# Patient Record
Sex: MALE | Race: BLACK OR AFRICAN AMERICAN | NOT HISPANIC OR LATINO | Employment: UNEMPLOYED | URBAN - METROPOLITAN AREA
[De-identification: names, ages, dates, MRNs, and addresses within clinical notes are randomized per-mention and may not be internally consistent; named-entity substitution may affect disease eponyms.]

---

## 2023-11-29 DIAGNOSIS — R13.10 DYSPHAGIA, UNSPECIFIED TYPE: ICD-10-CM

## 2023-11-29 RX ORDER — ESOMEPRAZOLE MAGNESIUM 20 MG/1
20 GRANULE, DELAYED RELEASE ORAL DAILY
COMMUNITY
End: 2023-11-29 | Stop reason: SDUPTHER

## 2023-11-29 RX ORDER — ESOMEPRAZOLE MAGNESIUM 20 MG/1
20 GRANULE, DELAYED RELEASE ORAL DAILY
Qty: 90 EACH | Refills: 1 | Status: SHIPPED | OUTPATIENT
Start: 2023-11-29 | End: 2024-02-26 | Stop reason: SDUPTHER

## 2023-12-06 ENCOUNTER — TELEPHONE (OUTPATIENT)
Dept: GASTROENTEROLOGY | Facility: CLINIC | Age: 28
End: 2023-12-06
Payer: MEDICAID

## 2023-12-06 NOTE — TELEPHONE ENCOUNTER
Fatou Nix, Norma's mom, called asking if it's possible to get an order for another feeding machine and pole for his day program? They are using the bolus method but Norma is getting upset as he's not used to that.   Please advise       Thank You

## 2024-01-22 ENCOUNTER — TELEMEDICINE (OUTPATIENT)
Dept: BEHAVIORAL HEALTH | Facility: CLINIC | Age: 29
End: 2024-01-22
Payer: MEDICAID

## 2024-01-22 DIAGNOSIS — F90.2 ATTENTION DEFICIT HYPERACTIVITY DISORDER (ADHD), COMBINED TYPE: ICD-10-CM

## 2024-01-22 DIAGNOSIS — F93.0 SEPARATION ANXIETY: Primary | ICD-10-CM

## 2024-01-22 DIAGNOSIS — F79 INTELLECTUAL DISABILITY: ICD-10-CM

## 2024-01-22 DIAGNOSIS — F84.0 AUTISM SPECTRUM DISORDER (HHS-HCC): ICD-10-CM

## 2024-01-22 PROCEDURE — 99214 OFFICE O/P EST MOD 30 MIN: CPT | Performed by: STUDENT IN AN ORGANIZED HEALTH CARE EDUCATION/TRAINING PROGRAM

## 2024-01-22 PROCEDURE — 90785 PSYTX COMPLEX INTERACTIVE: CPT | Performed by: STUDENT IN AN ORGANIZED HEALTH CARE EDUCATION/TRAINING PROGRAM

## 2024-01-22 PROCEDURE — 90833 PSYTX W PT W E/M 30 MIN: CPT | Performed by: STUDENT IN AN ORGANIZED HEALTH CARE EDUCATION/TRAINING PROGRAM

## 2024-01-22 NOTE — PROGRESS NOTES
PRESENT FOR VISIT:  -Patient  -Mother        LAST INTERVENTION  Last seen about 8 months ago.       #Interval Change  -Continues to have challenging behavior.  -Has tried severally day programs. The last one lasted about 2 weeks. Mother reports not being able to find one that is a good fit. Reports that the programs probably lacked structure and was not stimulating enough.       #ADHD  Stable.   Recall from prior: Hyperactivity not improved with higher dose. Instead seems more restless, anxious. Also exhibiting increased time spent ritualistically/repeatedly touching things around the house. Change noticeable within days of starting higher dose.        #Behavior   Stable. Continues to have challenging behavior.  No report of physical aggression, property destruction, elopement, or self-injurious behavior. Recall from prior: Occasionally grabs at mom's arm for attention. Prior to trying stimulant, had been noted to be hitting himself with his hand or hitting his head on the wall in anger.         #Anxiety/Separation Anxiety  Stable.   -No report of excessive worrying, perseverating, or reassurance-seeking behavior.   -No report of significant restlessness, pacing, or other signs suggesting psychomotor agitation.   -No report of signs/symptoms consistent with separation anxiety or panic attacks.  Recall from prior: Mom now working out of the basement. She reports that she has been able to get away for work and also goes out for errands. Patient no longer as preoccupied with her. Able to remain composed in her absence. Able to spend time with himself.         #Mood/Irritability   Stable.  -No report of significant changes in mood, crying spells, frequent mood swings, or significant irritability.   -No report of concern for depression from staff/family.  Recall from prior: rarely having meltdowns, maybe 1 to 3 per week. Able to calm him down a lot faster than before. Rarely lasting more 5 minutes instead of previous 15  minutes. No longer having raging anger and lashing out at others. Recall from prior, patient tends to give off a grunting noise ahead of getting angry but grunting does not always lead to anger.         #Sleep   Stable.  Recall from prior: New onset report of difficulty sleeping since starting increased stimulant dose. Problems with both falling asleep and awaking early. Has been taking stimulant around 8:00am. Recall from prior: once or twice per week will not sleep much (baseline).         #Medication  Mother reports that patient only takes Clonidine currently. Notes that she stopped administering Methylphenidate due to the side effects.   -Endorses medication adherence.  -No report of concerns for medication side effects.   -No report of significant issues with constipation (beyond baseline chronic constipation), excessive sedation, drooling, dizziness, tremors, rigidity, or shuffling gait.  -Patient/caregiver report that current medication regimen is felt to be effective and beneficial.  -Patient/caregiver report strong preference for not making medication changes at this time.       #Health  Mother reports that BP has been around 110/70. Patient exercises occasionally.  No report of hospitalizations, ED visits, new/worsening medical issues, or changes in non-psych medication since last visit.   Patient does not have a dentist.         REVIEW OF SYMPTOMS  -Depression: negative  -Anxiety: negative  -Psychosis: negative  -Nadia: negative  -ADHD: negative  -OCD: negative  -ASD: Deficits in social-emotional reciprocity. Some insistence on sameness. Will rearrange items in the bathroom for example. However, does not get excessively upset if prevented from doing so. Does well with transitions. Does not exhibit inflexible adherence to routines. Stereotyped/repetitive motor movements have resolved in adolescence. Loves doing puzzles, coloring, and playing [place], but mother reports not feeling that these are  obsessions.  -Aggression: does not historically behave aggressively towards others. However, on two occasions in the past two years he has grabbed his sister's arm in anger. See HPI for recent.   -Self-injury: when frustrated, occasionally hits his head on his tube feed machine or Nintendo switch/Gameboy  -Sleep: No issues reported. Getting adequate sleep and appearing well-rested. No changes from baseline. In the past did have a history of sleep issues but this resolved with CBD oil that he is no longer taking   -Appetite: No report of issues  -Medical ROS: patient does not endorse difficulty urinating, constipation, seizures, rash, or polydipsia.   *All other systems have been reviewed and are negative for complaint unless otherwise noted above           PAST PSYCHIATRIC HISTORY  Diagnosed with ASD and ID at around 20 months. Around age 15yo was briefly on Prozac for behavioral issues. At one point patient tried to open car door as mother was driving. Briefly hospitalized in [place]. Hospital assumed that to be a suicide attempt but mother disputes that. Patient was discharged on Zoloft. Soon after they discovered that patient had GI issues. Once those were addressed, behaviors resolved and he stopped the medication. Has not been on medications since then. Has not followed with psychiatry or been hospitalized since then either. In the past has had issues with irregular sleep schedule. Mother reports having used CBD oil for that but has since stopped as sleep has since normalized.     SOCIAL HISTORY:   -Lives with mom and two adult siblings  -Family: significant involvement  -Care needs: 24/7 supervision, requires supervision with ADLs, assistance with iADLS   -Work/School: attendance suspended during COVID pandemic since March  -Guardianship: mother is guardian  -Cognitive abilities: Formal testing as a child, was told he functioned at level of a 7 to 7yo. Cannot read or write. No money management  abilities.  -Does not endorse smoking, ETOH, or illicit drug use. No history of past substance abuse.  -No reported history of significant trauma   -No reported access to firearms      PAST MEDICAL HISTORY:   -Asthma  -Rumination syndrome  Around age 13 he started refusing PO intake. Required feeding tube for failure to thrive. In most recent history he has had ulcer. Patient will chew food and take liquids but he will spit it out. Refuses to swallow anything.  -Allergies: shrimp, Omeprazole (rash)  -No reported history of seizures  -No reported history of cardiac issues  -Had basic genetic workup around age 6yo to rule out things like Fragile X      FAMILY HISTORY  -No reported family history of suicide  -Brother with depression, two sisters with anxiety  -Has a second cousin with ASD  -No reported family history of early sudden cardiac arrest       Mental Status Exam    Appearance: well-groomed.   Build: average.   Demeanor: withdrawn.   Eye Contact: avoidant.   Motor Activity: average.   Speech:. minimal vocalizations.   Fund of Knowledge: poor fund of knowledge.   Delusions: None Reported.   Self Harm: As noted in HPI.   Aggressive: As noted in HPI.   Mood: euthymic.   Affect: restricted.   Orientation: alert.   Manner: guarded, withdrawn.   Thought process: concrete.   Content of thought: No SI or HI reported.   Behavior: normal motor activity, calm.   Attention/Concentration: normal.   Intelligence Estimate: intellectual disability.   Insight:. Poor.   Judgement:. Poor.   Musculoskeletal:. No TD, tics, or tremors appreciated.       RISK ASSESSMENT   Patient is currently felt to be at low acute and imminent risk of harm to self and others. However, chronic risk is elevated given history of self-harming behavior and aggression towards others. He does not currently meet criteria for inpatient psychiatric hospitalization given that he does not exhibit evidence of significant deterioration in baseline psychiatric  illness, aggression, self-injury, and ability to care for self. There is no evidence that patient's current caregivers/living environment are unable to safely manage patient's behavior. Overall risk mitigated by continued psychiatric follow-up care, psychopharmacologic intervention, 24/7 supervision, and [facility] services. Patient/caregivers are aware of emergency psychiatric resources available in the event of an acute psychiatric emergency, Mobile Crisis, 911, and local ER.        #Psychotherapy provided   -Provided 20 minutes of psychotherapy to patient and/or family/caregivers    -Psychotherapeutic interventions utilized:   --Supportive, Parent/Caregiver Training  -Target issues/Main topics discussed:  --Psychiatric symptoms, behavior, daily life, living situation, day program, exercise, hobbies/interests,   --Lifestyle (diet/exercise)  -Additional therapeutic interventions:   --Non-psychopharmacological Tx strategies were recommended. Family/caregivers provided with education using examples to illustrate and implementation advice offered.   -Response to therapy:  --Actively participated and responded favorably to the above psychotherapeutic interventions        IMPRESSION  Male with history of autism spectrum disorder, moderate intellectual disability, and rumination syndrome on tube feeds who initially presented with concern for behavioral issues in the setting of separation anxiety.          As of this appointment:  Patient appears to be psychiatrically and behaviorally stable. Patient reportedly has been doing fine without the Methylphenidate. Patient is doing well at home currently. Mother is looking for a day program. Recommended ways to engage patient to keep him busy. Tolerating medication and endorsing adherence. No new issues/concerns reported. Will continue current treatment plan and make no medication changes today. Follow up in about 3 months.      Diagnoses:  -ADHD  -Separation Anxiety  Disorder  -Autism Spectrum Disorder  -Intellectual Disability        PLAN / MANAGEMENT / RECOMMENDATIONS                 #Visit Complexity  -Visit of high complexity given patient's intellectual/developmental disability and/or impaired communication abilities resulting in need for the presence of a third party (mother) to provide clinical information and history.      #Medication Management  -Continue:  --Clonidine 0.1mg tid (8:00, 12:00, 20:00)  **Discontinued: Methylphenidate LA 20mg daily         #Medication Considerations  -Medication consent/assent:   Risks, benefits, alternatives, off-label uses, black box warnings, and frequent/important side effects of medications have been discussed with patient/caregiver. To the extent possible, they have voiced understanding and agreement with recommended use of psychotropic medication.     -Medical necessity for continued treatment with psychotropic medication:      [] Symptom reduction        [] Improvement in functioning      [x] Reduce risk of harm to self/others      [x] Maintenance therapy to prevent deterioration in functioning      -Rational for not reducing medication dose at this time:           [] Significant risk of deterioration in functioning       [x] Concern for elevating risk of harm to self/others      [] Prior dose reduction unsuccessful and/or harmful      [] Psychiatric/behavioral condition not adequately stabilized/optimized       [] Medication regimen recently modified          -OARRS  --I have personally reviewed patient's OARRS report. I have considered the risks of abuse, dependence, addiction, and diversion and feel that the potential benefits of treatment with a controlled substance currently outweigh the potential risks.  --OARRS last checked 1/22/2024      -Risk/Benefit assessment:  There is no report of signs/symptoms consistent with medication-induced impairment in daily functioning. At this time, benefits of medication felt to outweigh  potential risks. But we will continue to reassess need for psychotropic medication at regular 3 to 6 month intervals, or sooner as clinically indicated.          #Medication Monitoring Plan:   -Patient not currently taking medications requiring routine laboratory monitoring         #MDM/Complexity Issues    [] Chronic medical comorbidities     [x] Chronic risk of harm to self/others     [x] Intellectual/Developmental disability     [x] Need for independent historian due to intellectual/developmental disability and/or           impaired communication abilities     [x] Controlled substance medication requiring regular monitoring     [] Medication requiring significant ongoing monitoring for toxicity     #Counseling Provided     [x] Diagnostic impression/prognosis      [x] Risks and benefits of treatment options     [x] Instruction for management/treatment and follow-up     [x] Educated patient/caregiver about: behavioral interventions, sleep hygiene, safety planning                  #Coordination of care provided    [x] Family    [] Caregiver/DSP/Staff       []Agency supervisor       [] Nursing staff      [] SSA/          []Therapist                     [x] Guardian      #Psychotherapy with E/M services provided as documented above       #Follow-up      -in about 5 months, or sooner if new/worsening symptoms         >> Scheduled virtual Follow-up Appt on 6/13/2024 at 14:00       Time  -Prep time on date of the patient encounter: 10 minutes  -Time spent directly with patient/family/caregiver: 40 minutes  -Additional time spent on patient care activities: 10 minutes  -Documentation time: 10 minutes  -Total time on date of patient encounter: 70 minutes       Scribe Attestation  By signing my name below, I Olivia Bean-leeanne , Scribe   attest that this documentation has been prepared under the direction and in the presence of Leyla Bell DO.

## 2024-01-22 NOTE — PROGRESS NOTES
***PRESENT FOR VISIT:  -Patient  -Mother        ***LAST INTERVENTION  Last seen about 8 months ago. Report of increased anxiety, agitation and sleep disturbances following increase in stimulant dose. Patient was resumed on previously tolerated lower dose.       #Interval Change  ***  -Patient reported to be at about their psychiatric/behavioral baseline  or   If not, then what is different/changed???    -No report of new issues/concerns    or  -New report of ________    And briefly  -Overall status of problems that were addressed at last visit   -Response (good or bad) to medication changes  -Clinically pertinent topics that don't fit in other HPI categories (e.g., events, changing day program, stressors, etc)        #ADHD  ***  Recall from prior: Hyperactivity not improved with higher dose. Instead seems more restless, anxious. Also exhibiting increased time spent ritualistically/repeatedly touching things around the house. Change noticeable within days of starting higher dose.        #Behavior   ***  Behavior somewhat worse than prior. Possibly showing more anger. Nevertheless, there is no report of significant behavioral concerns. No report of physical aggression, property destruction, elopement, or self-injurious behavior. Recall from prior: Occasionally grabs at mom's arm for attention. Prior to trying stimulant, had been noted to be hitting himself with his hand or hitting his head on the wall in anger.         #Anxiety/Separation Anxiety  ***  ***Increased restlessness, appears more anxious per mum. Recall from prior: Mom now working out of the basement. She reports that she has been able to get away for work and also goes out for errands. Patient no longer as preoccupied with her. Able to remain composed in her absence. Able to spend time with himself.         #Mood/Irritability   ***  Recall from prior: rarely having meltdowns, maybe 1 to 3 per week. Able to calm him down a lot faster than before. Rarely  lasting more 5 minutes instead of previous 15 minutes. No longer having raging anger and lashing out at others. Recall from prior, patient tends to give off a grunting noise ahead of getting angry but grunting does not always lead to anger.         #Sleep   ***  Recall from prior: New onset report of difficulty sleeping since starting increased stimulant dose. Problems with both falling asleep and awaking early. Has been taking stimulant around 8:00am. Recall from prior: once or twice per week will not sleep much (baseline).         ***#Medication  Reports medication adherence. Side effects include sleep disturbances and increased anxiety. Appetite unchanged.   -Endorses medication adherence.  -No report of concerns for medication side effects.   -No report of significant issues with constipation (beyond baseline chronic constipation), excessive sedation, drooling, dizziness, tremors, rigidity, or shuffling gait.    -No report of significant change in frequency of use of PRNs for behaviors/agitation  -No report of significant change in effectiveness of PRNs for mitigating target behavior    -Patient/caregiver report that current medication regimen is felt to be effective and beneficial.  -Patient/caregiver report strong preference for not making medication changes at this time.       ***#Health  No report of hospitalizations, ED visits, new/worsening medical issues, or changes in non-psych medication since last visit.            REVIEW OF SYMPTOMS  -Depression: as per HPI  -Anxiety: as per HPI  -Psychosis: negative  -Nadia: negative  -ADHD: negative  -OCD: negative  -ASD: Deficits in social-emotional reciprocity. Some insistence on sameness. Will rearrange items in the bathroom for example. However, does not get excessively upset if prevented from doing so. Does well with transitions. Does not exhibit inflexible adherence to routines. Stereotyped/repetitive motor movements have resolved in adolescence. Loves doing  puzzles, coloring, and playing Key[employer] Switch, but mother reports not feeling that these are obsessions.  -Aggression: does not historically behave aggressively towards others. However, on two occasions in the past two years he has grabbed his sister's arm in anger. See HPI for recent.   -Self-injury: when frustrated, occasionally hits his head on his tube feed machine or Nintendo switch/Gameboy  -Sleep: No issues reported. Getting adequate sleep and appearing well-rested. No changes from baseline. In the past did have a history of sleep issues but this resolved with CBD oil that he is no longer taking   -Appetite: No report of issues  -Medical ROS: patient does not endorse difficulty urinating, constipation, seizures, rash, or polydipsia.   *All other systems have been reviewed and are negative for complaint unless otherwise noted above           PAST PSYCHIATRIC HISTORY  Diagnosed with ASD and ID at around 20 months. Around age 15yo was briefly on Prozac for behavioral issues. At one point patient tried to open car door as mother was driving. Briefly hospitalized in [place]. Hospital assumed that to be a suicide attempt but mother disputes that. Patient was discharged on Zoloft. Soon after they discovered that patient had GI issues. Once those were addressed, behaviors resolved and he stopped the medication. Has not been on medications since then. Has not followed with psychiatry or been hospitalized since then either. In the past has had issues with irregular sleep schedule. Mother reports having used CBD oil for that but has since stopped as sleep has since normalized.     SOCIAL HISTORY:   -Lives with mom and two adult siblings  -Family: significant involvement  -Care needs: 24/7 supervision, requires supervision with ADLs, assistance with iADLS   -Work/School: attendance suspended during COVID pandemic since March  -Guardianship: mother is guardian  -Cognitive abilities: Formal testing as a child, was told  he functioned at level of a 7 to 9yo. Cannot read or write. No money management abilities.  -Does not endorse smoking, ETOH, or illicit drug use. No history of past substance abuse.  -No reported history of significant trauma   -No reported access to firearms      PAST MEDICAL HISTORY:   -Asthma  -Rumination syndrome  Around age 13 he started refusing PO intake. Required feeding tube for failure to thrive. In most recent history he has had ulcer. Patient will chew food and take liquids but he will spit it out. Refuses to swallow anything.  -Allergies: shrimp, Omeprazole (rash)  -No reported history of seizures  -No reported history of cardiac issues  -Had basic genetic workup around age 4yo to rule out things like Fragile X      FAMILY HISTORY  -No reported family history of suicide  -Brother with depression, two sisters with anxiety  -Has a second cousin with ASD  -No reported family history of early sudden cardiac arrest       Mental Status Exam    Appearance: well-groomed.   Build: average.   Demeanor: withdrawn.   Eye Contact: avoidant.   Motor Activity: average.   Speech:. minimal vocalizations.   Fund of Knowledge: poor fund of knowledge.   Delusions: None Reported.   Self Harm: As noted in HPI.   Aggressive: As noted in HPI.   Mood: euthymic.   Affect: restricted.   Orientation: alert.   Manner: guarded, withdrawn.   Thought process: concrete.   Content of thought: No SI or HI reported.   Behavior: normal motor activity, calm.   Attention/Concentration: normal.   Intelligence Estimate: intellectual disability.   Insight:. Poor.   Judgement:. Poor.   Musculoskeletal:. No TD, tics, or tremors appreciated.       RISK ASSESSMENT   Patient is currently felt to be at low acute and imminent risk of harm to self and others. However, chronic risk is elevated given history of self-harming behavior and aggression towards others. He does not currently meet criteria for inpatient psychiatric hospitalization given that he  does not exhibit evidence of significant deterioration in baseline psychiatric illness, aggression, self-injury, and ability to care for self. There is no evidence that patient's current caregivers/living environment are unable to safely manage patient's behavior. Overall risk mitigated by continued psychiatric follow-up care, psychopharmacologic intervention, 24/7 supervision, and [facility] services. Patient/caregivers are aware of emergency psychiatric resources available in the event of an acute psychiatric emergency, Mobile Crisis, 911, and local ER.        ***#Psychotherapy provided   -Provided ______ minutes of psychotherapy to patient and/or family/caregivers      -Psychotherapeutic interventions utilized:   Used for most visits  --Supportive, Solutions-focused, Parent/Caregiver Training  Others sometimes used  --DBT/coping skills, Mindfulness, CBT, ACT, Behavioral therapy, Social Skills training, Coaching, Insight-oriented, Psychodynamic, ERP    -Target issues/Main topics discussed:  --Psychiatric symptoms, behavior, daily life, stressors, living situation, family/friends, day program/work/school, hobbies/interests, interpersonal conflicts, parent-child conflict  --Lifestyle (diet/exercise), Smoking cessation, Employment    -Additional therapeutic interventions:   --Non-psychopharmacological Tx strategies were recommended. Family/caregivers provided with education using examples to illustrate and implementation advice offered.       -Response to therapy:  --Actively participated and responded favorably to the above psychotherapeutic interventions      IMPRESSION  Male with history of autism spectrum disorder, moderate intellectual disability, and rumination syndrome on tube feeds who initially presented with concern for behavioral issues in the setting of separation anxiety.          As of this appointment:  ***        Diagnoses:  -ADHD  -Separation Anxiety Disorder  -Autism Spectrum Disorder  -Moderate  Intellectual Disability        PLAN / MANAGEMENT / RECOMMENDATIONS                 #Visit Complexity  -Visit of high complexity given patient's intellectual/developmental disability and/or impaired communication abilities resulting in need for the presence of a third party (mother) to provide clinical information and history.      #Medication Management  -Continue:  --Methylphenidate LA 20mg daily   --Clonidine 0.1mg tid (8:00, 12:00, 20:00)      #Medication Considerations  -Medication consent/assent:   Risks, benefits, alternatives, off-label uses, black box warnings, and frequent/important side effects of medications have been discussed with patient/caregiver. To the extent possible, they have voiced understanding and agreement with recommended use of psychotropic medication.     -Medical necessity for continued treatment with psychotropic medication:      [] Symptom reduction        [x] Improvement in functioning      [x] Reduce risk of harm to self/others      [x] Maintenance therapy to prevent deterioration in functioning      -Rational for not reducing medication dose at this time:           [] Significant risk of deterioration in functioning       [x] Concern for elevating risk of harm to self/others      [] Prior dose reduction unsuccessful and/or harmful      [x] Psychiatric/behavioral condition not adequately stabilized/optimized       [x] Medication regimen recently modified          -OARRS  --I have personally reviewed patient's OARRS report. I have considered the risks of abuse, dependence, addiction, and diversion and feel that the potential benefits of treatment with a controlled substance currently outweigh the potential risks.  --OARRS last checked _______      -Risk/Benefit assessment:  There is no report of signs/symptoms consistent with medication-induced impairment in daily functioning. At this time, benefits of medication felt to outweigh potential risks. But we will continue to reassess need  for psychotropic medication at regular 3 to 6 month intervals, or sooner as clinically indicated.          #Medication Monitoring Plan:   -Patient not currently taking medications requiring routine laboratory monitoring         #MDM/Complexity Issues    [] Chronic medical comorbidities     [x] Chronic risk of harm to self/others     [x] Intellectual/Developmental disability     [x] Need for independent historian due to intellectual/developmental disability and/or           impaired communication abilities     [] Controlled substance medication requiring regular monitoring     [x] Medication requiring significant ongoing monitoring for toxicity     #Counseling Provided     [x] Diagnostic impression/prognosis      [x] Risks and benefits of treatment options     [x] Instruction for management/treatment and follow-up     [x] Educated patient/caregiver about: behavioral interventions, sleep hygiene, safety planning                  #Coordination of care provided    [x] Family    [] Caregiver/DSP/Staff       []Agency supervisor       [] Nursing staff      [] SSA/          []Therapist                     [] Guardian        ***#Psychotherapy with E/M services provided as documented above       ***#Follow-up      -in about 3 months, or sooner if new/worsening symptoms    --Have asked patient's family/caregiver to call our office at [phone] for scheduling.       >> Scheduled virtual Follow-up Appt on 12/05/2024 at 13:00     ***time    Scribe Attestation  By signing my name below, I, Olivia Reyna , Scribe   attest that this documentation has been prepared under the direction and in the presence of Leyla Bell DO.

## 2024-01-22 NOTE — PATIENT INSTRUCTIONS
AFTER VISIT SUMMARY      Date: 1/22/2024  Appointment with Psychiatrist - Dr. Bell      Reason for Visit:  -Routine follow-up/Medication management      Discussed during Appointment:   -Day program  -Physical health, psychiatric/behavioral symptoms, daily functioning, new issues/concerns     -Treatment plan/management  -Medication      Clinical Impression/Status Update:  Patient appears to be psychiatrically and behaviorally stable. Tolerating medication and endorsing adherence. No new issues/concerns reported. Will continue current treatment plan and make no medication changes today.       #Clinic Policies/Procedures  -Refills  Prescriptions will be sent to your pharmacy with enough refills to last until your next appointment. For established patients, we typically provide 6 refills for regular meds and 3 refills for controlled substances (e.g., ADHD stimulant medication, benzodiazepines such as lorazepam). We will not provide additional refills beyond that without having an appointment. You can schedule an appointment by calling our office at [phone].     -Paperwork Requests (e.g., Expert Eval for guardianship)  We ask that you please schedule an appointment if needing paperwork filled out by the doctor (e.g., Expert Eval, FMLA form).  Please provide as much information as possible about your request and email the doctor any forms needing to be filled out prior to the appointment.       ===========================  INSTRUCTIONS/RECOMMENDATIONS  ===========================    #Medication   -No medication changes made today  --Continue taking your psych medications the same as usual    --Refills will be sent to your pharmacy      #Technical Issues with Epic -> Please help us improve the Epic experience  To help identify issues needing to be fixed and improve patient care, please report any issues you experience with Epic or [employer], such as difficulty connecting to video during virtual  visits.  [phone]      #Follow-up  --Your next appointment is scheduled for 6/13/2024 at 2:00pm (virtual visit with [facility])    *If having new/worsening symptoms, please call the clinic ([phone]) to discuss being seen sooner

## 2024-01-23 ENCOUNTER — OFFICE VISIT (OUTPATIENT)
Dept: PRIMARY CARE | Facility: CLINIC | Age: 29
End: 2024-01-23
Payer: MEDICAID

## 2024-01-23 ENCOUNTER — LAB (OUTPATIENT)
Dept: LAB | Facility: LAB | Age: 29
End: 2024-01-23
Payer: MEDICAID

## 2024-01-23 VITALS
OXYGEN SATURATION: 97 % | TEMPERATURE: 98.1 F | BODY MASS INDEX: 21.9 KG/M2 | HEIGHT: 70 IN | HEART RATE: 78 BPM | SYSTOLIC BLOOD PRESSURE: 126 MMHG | WEIGHT: 153 LBS | DIASTOLIC BLOOD PRESSURE: 70 MMHG

## 2024-01-23 DIAGNOSIS — Z00.00 PHYSICAL EXAM: Primary | ICD-10-CM

## 2024-01-23 DIAGNOSIS — J30.1 ALLERGIC RHINITIS DUE TO POLLEN, UNSPECIFIED SEASONALITY: ICD-10-CM

## 2024-01-23 DIAGNOSIS — Z00.00 PHYSICAL EXAM: ICD-10-CM

## 2024-01-23 LAB
ALBUMIN SERPL BCP-MCNC: 5.1 G/DL (ref 3.4–5)
ALP SERPL-CCNC: 75 U/L (ref 33–120)
ALT SERPL W P-5'-P-CCNC: 28 U/L (ref 10–52)
ANION GAP SERPL CALC-SCNC: 15 MMOL/L (ref 10–20)
AST SERPL W P-5'-P-CCNC: 27 U/L (ref 9–39)
BASOPHILS # BLD AUTO: 0.03 X10*3/UL (ref 0–0.1)
BASOPHILS NFR BLD AUTO: 0.6 %
BILIRUB SERPL-MCNC: 0.3 MG/DL (ref 0–1.2)
BUN SERPL-MCNC: 17 MG/DL (ref 6–23)
CALCIUM SERPL-MCNC: 10.2 MG/DL (ref 8.6–10.3)
CHLORIDE SERPL-SCNC: 99 MMOL/L (ref 98–107)
CHOLEST SERPL-MCNC: 222 MG/DL (ref 0–199)
CHOLESTEROL/HDL RATIO: 5.5
CO2 SERPL-SCNC: 29 MMOL/L (ref 21–32)
CREAT SERPL-MCNC: 0.64 MG/DL (ref 0.5–1.3)
EGFRCR SERPLBLD CKD-EPI 2021: >90 ML/MIN/1.73M*2
EOSINOPHIL # BLD AUTO: 0.29 X10*3/UL (ref 0–0.7)
EOSINOPHIL NFR BLD AUTO: 5.4 %
ERYTHROCYTE [DISTWIDTH] IN BLOOD BY AUTOMATED COUNT: 13.2 % (ref 11.5–14.5)
GLUCOSE SERPL-MCNC: 78 MG/DL (ref 74–99)
HCT VFR BLD AUTO: 47.7 % (ref 41–52)
HCV AB SER QL: NONREACTIVE
HDLC SERPL-MCNC: 40.4 MG/DL
HGB BLD-MCNC: 16 G/DL (ref 13.5–17.5)
HIV 1+2 AB+HIV1 P24 AG SERPL QL IA: NONREACTIVE
IMM GRANULOCYTES # BLD AUTO: 0.01 X10*3/UL (ref 0–0.7)
IMM GRANULOCYTES NFR BLD AUTO: 0.2 % (ref 0–0.9)
LDLC SERPL CALC-MCNC: 141 MG/DL
LYMPHOCYTES # BLD AUTO: 1.81 X10*3/UL (ref 1.2–4.8)
LYMPHOCYTES NFR BLD AUTO: 33.7 %
MCH RBC QN AUTO: 29.5 PG (ref 26–34)
MCHC RBC AUTO-ENTMCNC: 33.5 G/DL (ref 32–36)
MCV RBC AUTO: 88 FL (ref 80–100)
MONOCYTES # BLD AUTO: 0.49 X10*3/UL (ref 0.1–1)
MONOCYTES NFR BLD AUTO: 9.1 %
NEUTROPHILS # BLD AUTO: 2.74 X10*3/UL (ref 1.2–7.7)
NEUTROPHILS NFR BLD AUTO: 51 %
NON HDL CHOLESTEROL: 182 MG/DL (ref 0–149)
NRBC BLD-RTO: 0 /100 WBCS (ref 0–0)
PLATELET # BLD AUTO: 462 X10*3/UL (ref 150–450)
POTASSIUM SERPL-SCNC: 4.6 MMOL/L (ref 3.5–5.3)
PROT SERPL-MCNC: 7.9 G/DL (ref 6.4–8.2)
RBC # BLD AUTO: 5.43 X10*6/UL (ref 4.5–5.9)
SODIUM SERPL-SCNC: 138 MMOL/L (ref 136–145)
TRIGL SERPL-MCNC: 204 MG/DL (ref 0–149)
TSH SERPL-ACNC: 1.01 MIU/L (ref 0.44–3.98)
VLDL: 41 MG/DL (ref 0–40)
WBC # BLD AUTO: 5.4 X10*3/UL (ref 4.4–11.3)

## 2024-01-23 PROCEDURE — 83036 HEMOGLOBIN GLYCOSYLATED A1C: CPT

## 2024-01-23 PROCEDURE — 1036F TOBACCO NON-USER: CPT | Performed by: FAMILY MEDICINE

## 2024-01-23 PROCEDURE — 87389 HIV-1 AG W/HIV-1&-2 AB AG IA: CPT

## 2024-01-23 PROCEDURE — 85025 COMPLETE CBC W/AUTO DIFF WBC: CPT

## 2024-01-23 PROCEDURE — 86803 HEPATITIS C AB TEST: CPT

## 2024-01-23 PROCEDURE — 80061 LIPID PANEL: CPT

## 2024-01-23 PROCEDURE — 36415 COLL VENOUS BLD VENIPUNCTURE: CPT

## 2024-01-23 PROCEDURE — 84443 ASSAY THYROID STIM HORMONE: CPT

## 2024-01-23 PROCEDURE — 99395 PREV VISIT EST AGE 18-39: CPT | Performed by: FAMILY MEDICINE

## 2024-01-23 PROCEDURE — 80053 COMPREHEN METABOLIC PANEL: CPT

## 2024-01-23 RX ORDER — CLONIDINE HYDROCHLORIDE 0.1 MG/1
0.1 TABLET ORAL 3 TIMES DAILY
COMMUNITY
End: 2024-05-09 | Stop reason: SDUPTHER

## 2024-01-23 RX ORDER — FLUTICASONE PROPIONATE 50 MCG
1 SPRAY, SUSPENSION (ML) NASAL DAILY
Qty: 16 G | Refills: 11 | Status: SHIPPED | OUTPATIENT
Start: 2024-01-23 | End: 2025-01-22

## 2024-01-23 NOTE — PROGRESS NOTES
Subjective   Patient ID: Norma Salinas is a 28 y.o. male who presents for No chief complaint on file..  HPI  27 y/o M h/o autism and developmental delay, anorexia and malnutrition s/p PEG placement, severe EoE, anxiety, and depression ADD , following with psychiatry. for the PEG tube feeding , patient is following with GI and nutrition.     Scheduled with behavioral , tyring to get him to a farm when the weather is better with the help of his aid.     His psychiatrist is Dr. Leyla Bell, DO    patient is with his mom who is his gardann marie.   gets care giving agency monday thorough friday when she works from home. And she used to go to day program.   GI takes care of the tube feeding.   His GI is Dr Nieto .       Sometimes swallows  He still has feeding tube    He is using 6 and half cans a day , Peptamen 2.5 7 per day.     Stopepd the ADHD treatment due to agitation.     Scrottum healed well.     No bed sore ,still physically active.           Review of Systems    Past Medical History:   Diagnosis Date    Personal history of other diseases of the respiratory system 04/25/2018    History of sore throat    Personal history of other diseases of the respiratory system     History of asthma    Personal history of other mental and behavioral disorders     History of autism       Past Surgical History:   Procedure Laterality Date    OTHER SURGICAL HISTORY  02/11/2020    Esophagogastroduodenoscopy    OTHER SURGICAL HISTORY  02/11/2020    Percutaneous endoscopic jejunostomy tube insertion      Social History     Socioeconomic History    Marital status: Single     Spouse name: Not on file    Number of children: Not on file    Years of education: Not on file    Highest education level: Not on file   Occupational History    Not on file   Tobacco Use    Smoking status: Never    Smokeless tobacco: Never   Substance and Sexual Activity    Alcohol use: Never    Drug use: Never    Sexual activity: Not on file   Other  "Topics Concern    Not on file   Social History Narrative    Not on file     Social Determinants of Health     Financial Resource Strain: Not on file   Food Insecurity: Not on file   Transportation Needs: Not on file   Physical Activity: Not on file   Stress: Not on file   Social Connections: Not on file   Intimate Partner Violence: Not on file   Housing Stability: Not on file      No family history on file.    MEDICATIONS AND ALLERGIES:    ALLERGIES Omeprazole and Other    MEDICATIONS   Current Outpatient Medications on File Prior to Visit   Medication Sig Dispense Refill    esomeprazole (NexIUM Packet) 20 mg packet Take 20 mg by mouth once daily. 90 each 1     No current facility-administered medications on file prior to visit.        Objective   Visit Vitals  /70 (BP Location: Left arm, Patient Position: Sitting, BP Cuff Size: Adult)   Pulse 78   Temp 36.7 °C (98.1 °F)   Ht 1.778 m (5' 10\")   Wt 69.4 kg (153 lb)   SpO2 97%   BMI 21.95 kg/m²   Smoking Status Never   BSA 1.85 m²      Physical Exam  Constitutional:       Appearance: Normal appearance. He is normal weight.   HENT:      Head: Normocephalic.      Right Ear: Tympanic membrane normal.      Left Ear: Tympanic membrane normal.      Nose: Nose normal.      Mouth/Throat:      Pharynx: Oropharynx is clear.   Eyes:      Pupils: Pupils are equal, round, and reactive to light.   Cardiovascular:      Rate and Rhythm: Normal rate and regular rhythm.      Pulses: Normal pulses.      Heart sounds: Normal heart sounds.   Pulmonary:      Effort: Pulmonary effort is normal.      Breath sounds: Normal breath sounds. No stridor. No rhonchi.   Abdominal:      General: Bowel sounds are normal. There is no distension.      Tenderness: There is no abdominal tenderness. There is no guarding.   Musculoskeletal:         General: No swelling or tenderness.   Skin:     Coloration: Skin is not jaundiced or pale.   Neurological:      General: No focal deficit present.      " Mental Status: He is alert.       1. Physical exam    Exam is unremarkable at this time. No evidence of acute or chronic infection. Lung sounds clear throughout without evidence of wheeze, rales or rhonchi. Cardiac exam is normal and there is no gallop, murmur or rub.  Abdomen is soft and non-tender with normal bowel sounds throughout. Equal strength in all extremities with good deep tendon reflexes and normal peripheral pulses. Routine labs drawn at today's visit as indicated.    Continue healthy diet and exercise  Routine immunizations   advised      Limiting use of alcohol, reduce or abstain from tobacco use, abstain from substance abuse    Procedures   Continue with routine eye exams  Continue with routine dental exams  Continue with routine Dermatology / Skin checks  Colonoscopy due: at the age of 47     - Hepatitis C Antibody; Future  - TSH with reflex to Free T4 if abnormal; Future  - CBC and Auto Differential; Future  - Comprehensive Metabolic Panel; Future  - Hemoglobin A1C; Future  - Lipid Panel; Future  - HIV 1/2 Antigen/Antibody Screen with Reflex to Confirmation; Future    2. Allergic rhinitis due to pollen, unspecified seasonality  Will refill flonase.   - fluticasone (Flonase) 50 mcg/actuation nasal spray; Administer 1 spray into each nostril once daily. Shake gently. Before first use, prime pump. After use, clean tip and replace cap.  Dispense: 16 g; Refill: 11

## 2024-01-24 LAB
EST. AVERAGE GLUCOSE BLD GHB EST-MCNC: 114 MG/DL
HBA1C MFR BLD: 5.6 %

## 2024-01-25 ENCOUNTER — TELEPHONE (OUTPATIENT)
Dept: PRIMARY CARE | Facility: CLINIC | Age: 29
End: 2024-01-25
Payer: MEDICAID

## 2024-01-25 DIAGNOSIS — D75.839 THROMBOCYTOSIS: Primary | ICD-10-CM

## 2024-01-25 NOTE — TELEPHONE ENCOUNTER
----- Message from Uli Dawkins MD sent at 1/25/2024  1:11 PM EST -----  Labs stable , cholesterol slightly high   Will keep checking , it could be because he was not fasting.   Plts slightly elevated, not specific finding , will place order to be repeated in 2 month   Otherwise labs normal

## 2024-01-29 PROBLEM — F84.0 AUTISM SPECTRUM DISORDER (HHS-HCC): Status: ACTIVE | Noted: 2024-01-29

## 2024-01-29 PROBLEM — F79 INTELLECTUAL DISABILITY: Status: ACTIVE | Noted: 2024-01-29

## 2024-01-29 PROBLEM — F90.2 ATTENTION DEFICIT HYPERACTIVITY DISORDER (ADHD), COMBINED TYPE: Status: ACTIVE | Noted: 2024-01-29

## 2024-02-26 DIAGNOSIS — R13.10 DYSPHAGIA, UNSPECIFIED TYPE: ICD-10-CM

## 2024-02-26 RX ORDER — ESOMEPRAZOLE MAGNESIUM 20 MG/1
20 GRANULE, DELAYED RELEASE ORAL DAILY
Qty: 90 EACH | Refills: 1 | Status: SHIPPED | OUTPATIENT
Start: 2024-02-26

## 2024-03-01 DIAGNOSIS — Z09 PSYCHIATRIC FOLLOW-UP: ICD-10-CM

## 2024-03-01 DIAGNOSIS — Z86.59 PSYCHIATRIC FOLLOW-UP: ICD-10-CM

## 2024-03-15 RX ORDER — NUT.TX.IMPAIRED DIGESTIVE FXN 0.068G-1.5
1 LIQUID (ML) ORAL DAILY
COMMUNITY
Start: 2020-02-11

## 2024-03-15 RX ORDER — METHYLPHENIDATE HYDROCHLORIDE 20 MG/1
20 CAPSULE, EXTENDED RELEASE ORAL EVERY MORNING
COMMUNITY
Start: 2023-05-19

## 2024-03-15 RX ORDER — TRIAMCINOLONE ACETONIDE 1 MG/G
CREAM TOPICAL
COMMUNITY
Start: 2023-08-10

## 2024-03-15 RX ORDER — FLUOXETINE 10 MG/1
10 TABLET ORAL DAILY
COMMUNITY
Start: 2023-04-04

## 2024-03-15 RX ORDER — CHOLECALCIFEROL (VITAMIN D3) 125 MCG
125 CAPSULE ORAL DAILY
COMMUNITY

## 2024-03-15 RX ORDER — METHYLPHENIDATE HYDROCHLORIDE 30 MG/1
30 CAPSULE, EXTENDED RELEASE ORAL EVERY MORNING
COMMUNITY
Start: 2023-05-01

## 2024-03-18 ENCOUNTER — OFFICE VISIT (OUTPATIENT)
Dept: GASTROENTEROLOGY | Facility: CLINIC | Age: 29
End: 2024-03-18
Payer: MEDICAID

## 2024-03-18 VITALS — WEIGHT: 153 LBS | HEART RATE: 94 BPM | BODY MASS INDEX: 23.19 KG/M2 | HEIGHT: 68 IN

## 2024-03-18 DIAGNOSIS — K20.0 EOSINOPHILIC ESOPHAGITIS: Primary | ICD-10-CM

## 2024-03-18 DIAGNOSIS — Z93.1 PEG (PERCUTANEOUS ENDOSCOPIC GASTROSTOMY) STATUS (MULTI): ICD-10-CM

## 2024-03-18 PROCEDURE — 99214 OFFICE O/P EST MOD 30 MIN: CPT | Performed by: INTERNAL MEDICINE

## 2024-03-18 PROCEDURE — 1036F TOBACCO NON-USER: CPT | Performed by: INTERNAL MEDICINE

## 2024-03-18 ASSESSMENT — ENCOUNTER SYMPTOMS: SHORTNESS OF BREATH: 0

## 2024-03-18 NOTE — PATIENT INSTRUCTIONS
Continue on tube feeds. Management of the PEG tube as you are doing. Continue Omeprazole. Follow-up in the office in 1 year.

## 2024-03-18 NOTE — PROGRESS NOTES
REASON FOR VISIT:  EoE, PEG  PCP (requesting provider): Uli Dawkins MD.    HPI:  Norma Salinas is a 28 y.o. male with a past medical history of autism and developmental delay, anorexia and malnutrition s/p PEG placement following for PEG tube status and EoE. Patient with longstanding Joni-Key button PEG that is managed by his mother. EoE stable on Esomeprazole. The patient unfortunately has developmental delay with behavioral issues and despite multiple interventions with medications and also with trial of abdominal binder, he was removing his PEG on a nightly basis. This was causing significant trauma to the site and so his mother is actually removing the PEG at nighttime and sterilizing it and replacing it in the morning. She uses approximately 5 Joni-Keys a month through this process. This is not optimal but at this point it seems likely the best option available.    The patient is still struggling with pulling out his PEG but his mom is good about ensuring it is replaced. She removes the PEG at bedtime and replaces it in the morning as otherwise he will remove it at nighttime. She uses a little cream around the site to prevent granulation tissue. He follows with Nutrition regarding his feeds. He is on Peptamen 1.5 feeds and does bag feeds during the day and rarely bolus feeds. He occasionally takes things PO. He is still on Omeprazole. His weight is stable. He struggles in his day program as he has separation anxiety related to being away from his mom. Bowel habits are regular and daily. Insurance is working with medical device company. Joni-Lozano PEG is 14.5  Fr and 3.5 cm.    PSurgHx: None     FamHx: No esophagus, stomach, or colon cancer      SocHx: Denies smoking, alcohol, or illicits      Prior Endoscopy:  -EGD (12/2019): No procedure report available (path showed esophageal biopsies with mild EoE up to 14 eos / hpf, normal gastric biopsies, normal duodenal biopsies).  -EGD (12/2017): No procedure report  available (path showed mid and distal esophageal biopsies with EoE up to 19-33 eos / hpf, gastric biopsies with mild eosinophilia, and duodenal biopsies with mild duodenitis).  -EGD (8/2017): No procedure report available (path showed esophageal biopsies with EoE, gastric biopsies with slightly prominent eosinophilia, duodenal biopsies with mild patchy peptic duodenitis).  -EGD (4/2016): Prominent folds with mild erythema in the distal esophagus (normal proximal and distal esophageal biopsies), normal stomach (normal gastric biopsies), normal duodenum (normal duodenal biopsies), Joni-Key button PEG in place.  -EGD (7/2014): Mid and distal esophagus with linear mucosal furrowing with decreased vascular markings (mid and distal esophageal biopsies with severe EoE up to 150 eos / hpf), prominent thickened mucosal folds at GEJ, normal stomach (normal gastric biopsies), normal duodenum (normal duodenal biopsies), Joni-Key button PEG in place.  -EGD (3/2014): Extensive mucosal furrowing in mid and distal esophagus (esophageal biopsies showed severe EoE with 150-200 eos / hpf).  -EGD (1/2013): Linear furrowing and ringed appearance of the entire esophagus (esophageal biopsies with EoE with up to 60-90 eos / hpf), PEG tract dilated with placement of 14 Fr and 2.5 cm Joni-Key button PEG, normal duodenum.  -EGD (5/2012): No procedure report available (proximal and distal esophageal biopsies normal).  -EGD (7/2007): Normal esophagus, placement of 14 Fr and 2 .5 cm Joni-Key PEG, normal duodenum.  -EGD (3/2007): No procedure report available (path showed esophageal biopsies with reflux changes and normal gastric biopsies).     PAST MEDICAL HISTORY  Past Medical History:   Diagnosis Date    Personal history of other diseases of the respiratory system 04/25/2018    History of sore throat    Personal history of other diseases of the respiratory system     History of asthma    Personal history of other mental and behavioral disorders      History of autism       PAST SURGICAL HISTORY  Past Surgical History:   Procedure Laterality Date    OTHER SURGICAL HISTORY  02/11/2020    Esophagogastroduodenoscopy    OTHER SURGICAL HISTORY  02/11/2020    Percutaneous endoscopic jejunostomy tube insertion       FAMILY HISTORY  No family history on file.    SOCIAL HISTORY   reports that he has never smoked. He has never used smokeless tobacco. He reports that he does not drink alcohol and does not use drugs.    REVIEW OF SYSTEMS  Review of Systems   Respiratory:  Negative for shortness of breath.    Cardiovascular:  Negative for chest pain.   All other systems reviewed and are negative.    A 10+ point review of systems was otherwise negative except as noted and per HPI.    ALLERGIES  Allergies   Allergen Reactions    Omeprazole Hives, Itching, Nausea And Vomiting, Nausea Only and Nausea/vomiting    Other Other     Per testing    Shrimp Unknown       MEDICATIONS  Current Outpatient Medications   Medication Instructions    ascorbic acid/vitamin E (VITAMIN C-VITAMIN E ORAL) 1 each, oral, Daily    B.animalis,bifid,infantis,long (PROBIOTIC 4X ORAL) 1 each, oral, Daily    cholecalciferol (VITAMIN D-3) 125 mcg, oral, Daily    cloNIDine (CATAPRES) 0.1 mg, oral, 3 times daily    esomeprazole (NEXIUM PACKET) 20 mg, oral, Daily    FLUoxetine (PROZAC) 10 mg, oral, Daily    fluticasone (Flonase) 50 mcg/actuation nasal spray 1 spray, Each Nostril, Daily, Shake gently. Before first use, prime pump. After use, clean tip and replace cap.    methylphenidate LA (RITALIN LA) 20 mg, oral, Every morning    methylphenidate LA (RITALIN LA) 30 mg, oral, Every morning    nut.tx.impaired digest fxn (Peptamen 1.5) 0.068 gram- 1.5 kcal/mL liquid 1 each, oral, Daily    triamcinolone (Kenalog) 0.1 % cream APPLY TOPICALLY TO THE AFFECTED AREA 2 TO 3 TIMES DAILY    TURMERIC ORAL 1 each, oral, Daily       VITALS  Vitals:    03/18/24 0913   Pulse: 94      Body mass index is 23.26  "kg/m².    PHYSICAL EXAM  CONSTITUTIONAL: NAD, appears stated age  EYES: anicteric sclera, sclera clear  HEAD: normocephalic, atraumatic   NECK: supple   PULMONARY: CTAB  CARDIOVASCULAR: RRR, no M/R/G appreciated   ABDOMEN: soft, NTND, PEG site c/d/I with small around or irritation and scar tissue around the site   MUSCULOSKELETAL: no edema  SKIN: no jaundice   PSYCHIATRIC: developmental delay    LABS  WBC   Date Value   01/23/2024 5.4 x10*3/uL   09/01/2022 4.8 x10E9/L   02/11/2020 4.5 x10E9/L     Hemoglobin (g/dL)   Date Value   01/23/2024 16.0   09/01/2022 15.7   02/11/2020 16.2     Platelets   Date Value   01/23/2024 462 x10*3/uL (H)   09/01/2022 404 x10E9/L   02/11/2020 348 x10E9/L     Sodium (mmol/L)   Date Value   01/23/2024 138   09/01/2022 138   02/11/2020 140     Potassium (mmol/L)   Date Value   01/23/2024 4.6   09/01/2022 4.6   02/11/2020 4.2     Chloride (mmol/L)   Date Value   01/23/2024 99   09/01/2022 102   02/11/2020 102     Bicarbonate (mmol/L)   Date Value   01/23/2024 29   09/01/2022 30   02/11/2020 29     Urea Nitrogen (mg/dL)   Date Value   01/23/2024 17   09/01/2022 16   02/11/2020 22     Creatinine (mg/dL)   Date Value   01/23/2024 0.64   09/01/2022 0.66   02/11/2020 0.67     Calcium (mg/dL)   Date Value   01/23/2024 10.2   09/01/2022 10.3   02/11/2020 10.1     Total Protein (g/dL)   Date Value   01/23/2024 7.9   09/01/2022 7.8   02/11/2020 8.2     Bilirubin, Total (mg/dL)   Date Value   01/23/2024 0.3     Total Bilirubin (mg/dL)   Date Value   09/01/2022 0.3   02/11/2020 0.3     Alkaline Phosphatase (U/L)   Date Value   01/23/2024 75   09/01/2022 78   02/11/2020 91     ALT (U/L)   Date Value   01/23/2024 28     ALT (SGPT) (U/L)   Date Value   09/01/2022 27   02/11/2020 22     AST (U/L)   Date Value   01/23/2024 27   09/01/2022 25   02/11/2020 36     Glucose (mg/dL)   Date Value   01/23/2024 78   09/01/2022 81   02/11/2020 82     No results found for: \"LIPASE\", \"CRP\"    ASSESSMENT/PLAN  Derrien " Brad is a 28 y.o. male with a past medical history of autism and developmental delay, anorexia and malnutrition s/p PEG placement following for PEG tube status and EoE. Patient with longstanding Joni-Key button PEG that is managed by his mother. EoE stable on Esomeprazole. The patient unfortunately has developmental delay with behavioral issues and despite multiple interventions with medications and also with trial of abdominal binder, he was removing his PEG on a nightly basis. This was causing significant trauma to the site and so his mother is actually removing the PEG at nighttime and sterilizing it and replacing it in the morning. She uses approximately 5 Joni-Keys a month through this process. This is not optimal but at this point it seems likely the best option available as he also does not tolerate an abdominal  binder.    -Continue management of PEG tube as noted per above  -Nutrition goals and tube feed regimen as recommended per Nutrition   -Continue Esomperazole 20 mg daily    Follow-up in the office in 1 year.    Signature: Mark Nieto MD

## 2024-05-09 DIAGNOSIS — F93.0 SEPARATION ANXIETY: ICD-10-CM

## 2024-05-09 DIAGNOSIS — F79 INTELLECTUAL DISABILITY: ICD-10-CM

## 2024-05-09 DIAGNOSIS — F84.0 AUTISM SPECTRUM DISORDER (HHS-HCC): ICD-10-CM

## 2024-05-09 DIAGNOSIS — F90.2 ATTENTION DEFICIT HYPERACTIVITY DISORDER (ADHD), COMBINED TYPE: ICD-10-CM

## 2024-05-09 RX ORDER — CLONIDINE HYDROCHLORIDE 0.1 MG/1
TABLET ORAL
Qty: 270 TABLET | Refills: 1 | Status: SHIPPED | OUTPATIENT
Start: 2024-05-09

## 2024-06-13 ENCOUNTER — APPOINTMENT (OUTPATIENT)
Dept: BEHAVIORAL HEALTH | Facility: CLINIC | Age: 29
End: 2024-06-13
Payer: MEDICAID

## 2024-06-13 DIAGNOSIS — F84.0 AUTISM SPECTRUM DISORDER (HHS-HCC): ICD-10-CM

## 2024-06-13 DIAGNOSIS — F90.2 ATTENTION DEFICIT HYPERACTIVITY DISORDER (ADHD), COMBINED TYPE: ICD-10-CM

## 2024-06-13 DIAGNOSIS — F79 INTELLECTUAL DISABILITY: ICD-10-CM

## 2024-06-13 DIAGNOSIS — F93.0 SEPARATION ANXIETY: ICD-10-CM

## 2024-06-13 DIAGNOSIS — Z09 PSYCHIATRIC FOLLOW-UP: ICD-10-CM

## 2024-06-13 DIAGNOSIS — Z86.59 PSYCHIATRIC FOLLOW-UP: ICD-10-CM

## 2024-06-13 PROCEDURE — 90785 PSYTX COMPLEX INTERACTIVE: CPT | Performed by: STUDENT IN AN ORGANIZED HEALTH CARE EDUCATION/TRAINING PROGRAM

## 2024-06-13 PROCEDURE — 99214 OFFICE O/P EST MOD 30 MIN: CPT | Performed by: STUDENT IN AN ORGANIZED HEALTH CARE EDUCATION/TRAINING PROGRAM

## 2024-06-13 PROCEDURE — 90833 PSYTX W PT W E/M 30 MIN: CPT | Performed by: STUDENT IN AN ORGANIZED HEALTH CARE EDUCATION/TRAINING PROGRAM

## 2024-06-13 NOTE — PROGRESS NOTES
PRESENT FOR VISIT:  -Patient  -Mother        LAST INTERVENTION  Last seen about 5 months ago. Patient appeared to be psychiatrically and behaviorally stable. Mother was looking for a day program. Recommended ways to engage patient to keep him busy. No medication changes.      #Interval Change  -Patient reported to be at about their psychiatric/behavioral baseline  -No report of new issues/concerns    -Patient will be living at home without his mom soon. Will be getting a roommate.      #ADHD  Stable.   Recall from prior: Hyperactivity not improved with higher dose. Instead seems more restless, anxious. Also exhibiting increased time spent ritualistically/repeatedly touching things around the house. Change noticeable within days of starting higher dose.        #Behavior   Stable.   No report of physical aggression, property destruction, elopement, or self-injurious behavior. Recall from prior: Occasionally grabs at mom's arm for attention. Prior to trying stimulant, had been noted to be hitting himself with his hand or hitting his head on the wall in anger.         #Anxiety/Separation Anxiety  Stable. Patient does not fare well with separation from mother. Mother reports that he shows separation anxiety when he is away from her outside the home but not when he is at home.   -No report of excessive worrying, perseverating, or reassurance-seeking behavior.   -No report of significant restlessness, pacing, or other signs suggesting psychomotor agitation.   -No report of signs/symptoms consistent with separation anxiety or panic attacks.  Recall from prior: Mom now working out of the basement. She reports that she has been able to get away for work and also goes out for errands. Patient no longer as preoccupied with her. Able to remain composed in her absence. Able to spend time with himself.         #Mood/Irritability   Stable.  -No report of significant changes in mood, crying spells, frequent mood swings, or  significant irritability.   -No report of concern for depression from staff/family.  Recall from prior: rarely having meltdowns, maybe 1 to 3 per week. Able to calm him down a lot faster than before. Rarely lasting more 5 minutes instead of previous 15 minutes. No longer having raging anger and lashing out at others. Recall from prior, patient tends to give off a grunting noise ahead of getting angry but grunting does not always lead to anger.         #Sleep   Stable.  Recall from prior: New onset report of difficulty sleeping since starting increased stimulant dose. Problems with both falling asleep and awaking early. Has been taking stimulant around 8:00am. Recall from prior: once or twice per week will not sleep much (baseline).         #Medication  -Endorses medication adherence.  -No report of concerns for medication side effects.   -No report of significant issues with constipation (beyond baseline chronic constipation), excessive sedation, drooling, dizziness, tremors, rigidity, or shuffling gait.  -Patient/caregiver report that current medication regimen is felt to be effective and beneficial.  -Patient/caregiver report strong preference for not making medication changes at this time.  Recall from prior: Mother reports that patient only takes Clonidine currently. Notes that she stopped administering Methylphenidate due to the side effects.        #Health  Stable.  No report of hospitalizations, ED visits, new/worsening medical issues, or changes in non-psych medication since last visit.   Patient does not have a dentist.         REVIEW OF SYMPTOMS  -Depression: negative  -Anxiety: negative  -Psychosis: negative  -Nadia: negative  -ADHD: negative  -OCD: negative  -ASD: Deficits in social-emotional reciprocity. Some insistence on sameness. Will rearrange items in the bathroom for example. However, does not get excessively upset if prevented from doing so. Does well with transitions. Does not exhibit inflexible  adherence to routines. Stereotyped/repetitive motor movements have resolved in adolescence. Loves doing puzzles, coloring, and playing [employer], but mother reports not feeling that these are obsessions.  -Aggression: does not historically behave aggressively towards others. However, on two occasions in the past two years he has grabbed his sister's arm in anger. See HPI for recent.   -Self-injury: when frustrated, occasionally hits his head on his tube feed machine or Feniksdo switch/Gameboy  -Sleep: No issues reported. Getting adequate sleep and appearing well-rested. No changes from baseline. In the past did have a history of sleep issues but this resolved with CBD oil that he is no longer taking   -Appetite: No report of issues  -Medical ROS: patient does not endorse difficulty urinating, constipation, seizures, rash, or polydipsia.   *All other systems have been reviewed and are negative for complaint unless otherwise noted above           PAST PSYCHIATRIC HISTORY  Diagnosed with ASD and ID at around 20 months. Around age 13yo was briefly on Prozac for behavioral issues. At one point patient tried to open car door as mother was driving. Briefly hospitalized in [place]. Hospital assumed that to be a suicide attempt but mother disputes that. Patient was discharged on Zoloft. Soon after they discovered that patient had GI issues. Once those were addressed, behaviors resolved and he stopped the medication. Has not been on medications since then. Has not followed with psychiatry or been hospitalized since then either. In the past has had issues with irregular sleep schedule. Mother reports having used CBD oil for that but has since stopped as sleep has since normalized.     SOCIAL HISTORY:   -Lives with mom and two adult siblings  -Family: significant involvement  -Care needs: 24/7 supervision, requires supervision with ADLs, assistance with iADLS   -Work/School: attendance suspended during COVID pandemic  since March  -Guardianship: mother is guardian  -Cognitive abilities: Formal testing as a child, was told he functioned at level of a 7 to 9yo. Cannot read or write. No money management abilities.  -Does not endorse smoking, ETOH, or illicit drug use. No history of past substance abuse.  -No reported history of significant trauma   -No reported access to firearms      PAST MEDICAL HISTORY:   -Asthma  -Rumination syndrome  Around age 13 he started refusing PO intake. Required feeding tube for failure to thrive. In most recent history he has had ulcer. Patient will chew food and take liquids but he will spit it out. Refuses to swallow anything.  -Allergies: shrimp, Omeprazole (rash)  -No reported history of seizures  -No reported history of cardiac issues  -Had basic genetic workup around age 6yo to rule out things like Fragile X      FAMILY HISTORY  -No reported family history of suicide  -Brother with depression, two sisters with anxiety  -Has a second cousin with ASD  -No reported family history of early sudden cardiac arrest       Mental Status Exam    Appearance: well-groomed.   Build: average.   Demeanor: withdrawn.   Eye Contact: avoidant.   Motor Activity: average.   Speech:. minimal vocalizations.   Fund of Knowledge: poor fund of knowledge.   Delusions: None Reported.   Self Harm: As noted in HPI.   Aggressive: As noted in HPI.   Mood: euthymic.   Affect: restricted.   Orientation: alert.   Manner: guarded, withdrawn.   Thought process: concrete.   Content of thought: No SI or HI reported.   Behavior: normal motor activity, calm.   Attention/Concentration: normal.   Intelligence Estimate: intellectual disability.   Insight:. Poor.   Judgement:. Poor.   Musculoskeletal:. No TD, tics, or tremors appreciated.       RISK ASSESSMENT   Patient is currently felt to be at low acute and imminent risk of harm to self and others. However, chronic risk is elevated given history of self-harming behavior and aggression  towards others. He does not currently meet criteria for inpatient psychiatric hospitalization given that he does not exhibit evidence of significant deterioration in baseline psychiatric illness, aggression, self-injury, and ability to care for self. There is no evidence that patient's current caregivers/living environment are unable to safely manage patient's behavior. Overall risk mitigated by continued psychiatric follow-up care, psychopharmacologic intervention, 24/7 supervision, and [facility] services. Patient/caregivers are aware of emergency psychiatric resources available in the event of an acute psychiatric emergency, Mobile Crisis, 911, and local ER.        #Psychotherapy provided   -Provided 20 minutes of psychotherapy to patient and/or family/caregivers    -Psychotherapeutic interventions utilized:   --Supportive, Parent/Caregiver Training  -Target issues/Main topics discussed:  --Psychiatric symptoms, behavior, daily life, living situation, day program, exercise, hobbies/interests,   --Lifestyle (diet/exercise)  -Additional therapeutic interventions:   --Non-psychopharmacological Tx strategies were recommended. Family/caregivers provided with education using examples to illustrate and implementation advice offered.   -Response to therapy:  --Actively participated and responded favorably to the above psychotherapeutic interventions        IMPRESSION  Male with history of autism spectrum disorder, moderate intellectual disability, and rumination syndrome on tube feeds who initially presented with concern for behavioral issues in the setting of separation anxiety.          As of this appointment:  Patient reported to be at his psychiatric and behavioral baseline. No new issues/concerns. Tolerating medication and endorsing adherence. He did not do well at day program due to separation anxiety. Mother reports that the anxiety is more related to being away from the house and not necessarily being away from  her. Reports that she will be moving out of their home in a few months leaving him with a roommate and caregivers. After which he will retry day program. Will continue current treatment plan and make no medication changes today. Follow up in about 3 months.      Diagnoses:  -ADHD  -Separation Anxiety Disorder  -Autism Spectrum Disorder  -Intellectual Disability        PLAN / MANAGEMENT / RECOMMENDATIONS                 #Visit Complexity  -Visit of high complexity given patient's intellectual/developmental disability and/or impaired communication abilities resulting in need for the presence of a third party (mother) to provide clinical information and history.      #Medication Management  -Continue:  --Clonidine 0.1mg tid (8:00, 12:00, 20:00)  **formally discontinued in EMR: Methylphenidate LA 20mg daily         #Medication Considerations  -Medication consent/assent:   Risks, benefits, alternatives, off-label uses, black box warnings, and frequent/important side effects of medications have been discussed with patient/caregiver. To the extent possible, they have voiced understanding and agreement with recommended use of psychotropic medication.     -Medical necessity for continued treatment with psychotropic medication:      [] Symptom reduction        [] Improvement in functioning      [] Reduce risk of harm to self/others      [x] Maintenance therapy to prevent deterioration in functioning      -Rational for not reducing medication dose at this time:           [x] Significant risk of deterioration in functioning       [] Concern for elevating risk of harm to self/others      [x] Prior dose reduction unsuccessful and/or harmful      [] Psychiatric/behavioral condition not adequately stabilized/optimized       [] Medication regimen recently modified          -OARRS  --I have personally reviewed patient's OARRS report. I have considered the risks of abuse, dependence, addiction, and diversion and feel that the  potential benefits of treatment with a controlled substance currently outweigh the potential risks.      -Risk/Benefit assessment:  There is no report of signs/symptoms consistent with medication-induced impairment in daily functioning. At this time, benefits of medication felt to outweigh potential risks. But we will continue to reassess need for psychotropic medication at regular 3 to 6 month intervals, or sooner as clinically indicated.          #Medication Monitoring Plan:   -Patient not currently taking medications requiring routine laboratory monitoring         #MDM/Complexity Issues    [] Chronic medical comorbidities     [x] Chronic risk of harm to self/others     [x] Intellectual/Developmental disability     [x] Need for independent historian due to intellectual/developmental disability and/or           impaired communication abilities     [x] Controlled substance medication requiring regular monitoring     [] Medication requiring significant ongoing monitoring for toxicity     #Counseling Provided     [x] Diagnostic impression/prognosis      [x] Risks and benefits of treatment options     [x] Instruction for management/treatment and follow-up     [x] Educated patient/caregiver about: behavioral interventions, sleep hygiene, safety planning                  #Coordination of care provided    [x] Family    [] Caregiver/DSP/Staff       []Agency supervisor       [] Nursing staff      [] SSA/          []Therapist                     [] Guardian      #Psychotherapy with E/M services provided as documented above       #Follow-up      -in about 3 or 4 months, or sooner if new/worsening symptoms         >> Scheduled virtual Follow-up Appt on 10/10/2024 at 14:00       Time  -Prep time on date of the patient encounter: 10 minutes  -Time spent directly with patient/family/caregiver: 40 minutes  -Additional time spent on patient care activities: 10 minutes  -Documentation time: 10 minutes  -Total time on date  of patient encounter: 70 minutes       Scribe Attestation  By signing my name below, I, Olivia Del Angel-JD [facility] , Scribe   attest that this documentation has been prepared under the direction and in the presence of Leyla Bell DO.

## 2024-06-13 NOTE — PATIENT INSTRUCTIONS
AFTER VISIT SUMMARY      Date: 6/13/2024  Appointment with Psychiatrist - Dr. Bell      Reason for Visit:  -Routine follow-up/Medication management      Discussed during Appointment:   -Separation anxiety, day program  -Physical health, psychiatric/behavioral symptoms, daily functioning, new issues/concerns         Clinical Impression/Status Update:  Patient reported to be at his psychiatric and behavioral baseline. No new issues/concerns. Tolerating medication and endorsing adherence. He did not do well at day program due to separation anxiety. Mother reports that the anxiety is more related to being away from the house and not necessarily being away from her. Reports that she will be moving out of their home in a few months leaving him with a roommate and caregivers. After which he will retry day program. Will continue current treatment plan and make no medication changes today.       #Clinic Policies/Procedures  -Refills  Prescriptions will be sent to your pharmacy with enough refills to last until your next appointment. For established patients, we typically provide 6 refills for regular meds and 3 refills for controlled substances (e.g., ADHD stimulant medication, benzodiazepines such as lorazepam). We will not provide additional refills beyond that without having an appointment. You can schedule an appointment by calling our office at [phone].     -Paperwork Requests (e.g., Expert Eval for guardianship)  We ask that you please schedule an appointment if needing paperwork filled out by the doctor (e.g., Expert Eval, FMLA form).  Please provide as much information as possible about your request and email the doctor any forms needing to be filled out prior to the appointment.       ===========================  INSTRUCTIONS/RECOMMENDATIONS  ===========================    #Medication   -No medication changes made today  --Continue taking your psych medications the same as usual    --Refills will be sent  to your pharmacy    >>For prior authorization issues at the pharmacy -> Call the office and ask to talk to Nurse Frost.      #Technical Issues with Epic -> Please help us improve the Epic experience  To help identify issues needing to be fixed and improve patient care, please report any issues you experience with Epic or [facility], such as difficulty connecting to video during virtual visits.  [phone]    #Follow-up  --Your next appointment is scheduled for 10/10/2024 at 2:00pm (virtual visit with [facility])    *If having new/worsening symptoms, please call the clinic ([phone]) to discuss being seen sooner   >>If unable to reach the office, send me an email at [email or web address]

## 2024-09-25 DIAGNOSIS — R13.10 DYSPHAGIA, UNSPECIFIED TYPE: ICD-10-CM

## 2024-09-25 RX ORDER — ESOMEPRAZOLE MAGNESIUM 20 MG/1
20 GRANULE, DELAYED RELEASE ORAL DAILY
Qty: 90 EACH | Refills: 0 | Status: SHIPPED | OUTPATIENT
Start: 2024-09-25

## 2024-09-27 ENCOUNTER — OFFICE VISIT (OUTPATIENT)
Dept: PRIMARY CARE | Facility: CLINIC | Age: 29
End: 2024-09-27
Payer: MEDICAID

## 2024-09-27 VITALS — HEART RATE: 78 BPM | TEMPERATURE: 98.1 F | RESPIRATION RATE: 16 BRPM

## 2024-09-27 DIAGNOSIS — H10.9 CONJUNCTIVITIS OF BOTH EYES, UNSPECIFIED CONJUNCTIVITIS TYPE: Primary | ICD-10-CM

## 2024-09-27 PROCEDURE — 99213 OFFICE O/P EST LOW 20 MIN: CPT | Performed by: FAMILY MEDICINE

## 2024-09-27 RX ORDER — ERYTHROMYCIN 5 MG/G
OINTMENT OPHTHALMIC 4 TIMES DAILY
Qty: 3.5 G | Refills: 2 | Status: SHIPPED | OUTPATIENT
Start: 2024-09-27 | End: 2024-10-04

## 2024-09-27 NOTE — PROGRESS NOTES
Subjective   Patient ID: Norma Salinas is a 29 y.o. male who presents for Conjunctivitis (Crusty eyes, redness, itchy ).  HPI  Conjunctivitis both eyes with crusting and itching.   Review of Systems    Past Medical History:   Diagnosis Date    Personal history of other diseases of the respiratory system 04/25/2018    History of sore throat    Personal history of other diseases of the respiratory system     History of asthma    Personal history of other mental and behavioral disorders     History of autism       Past Surgical History:   Procedure Laterality Date    OTHER SURGICAL HISTORY  02/11/2020    Esophagogastroduodenoscopy    OTHER SURGICAL HISTORY  02/11/2020    Percutaneous endoscopic jejunostomy tube insertion      Social History     Socioeconomic History    Marital status: Single   Tobacco Use    Smoking status: Never    Smokeless tobacco: Never   Substance and Sexual Activity    Alcohol use: Never    Drug use: Never      No family history on file.    MEDICATIONS AND ALLERGIES:    ALLERGIES Omeprazole, Other, and Shrimp    MEDICATIONS   Current Outpatient Medications on File Prior to Visit   Medication Sig Dispense Refill    ascorbic acid/vitamin E (VITAMIN C-VITAMIN E ORAL) Take 1 each by mouth once daily.      B.animalis,bifid,infantis,long (PROBIOTIC 4X ORAL) Take 1 each by mouth once daily.      cholecalciferol (Vitamin D-3) 125 MCG (5000 UT) capsule Take 1 capsule (125 mcg) by mouth once daily.      cloNIDine (Catapres) 0.1 mg tablet Take one tablet (0.1mg) scheduled three times a day - in morning (8:00am), midday (12:00pm), and evening (8:00pm) for anxiety/ADHD. 270 tablet 1    esomeprazole (NexIUM Packet) 20 mg packet Take 20 mg by mouth once daily. 90 each 0    FLUoxetine (PROzac) 10 mg tablet Take 1 tablet (10 mg) by mouth once daily.      fluticasone (Flonase) 50 mcg/actuation nasal spray Administer 1 spray into each nostril once daily. Shake gently. Before first use, prime pump. After use,  "clean tip and replace cap. 16 g 11    methylphenidate LA (Ritalin LA) 20 mg 24 hr capsule Take 1 capsule (20 mg) by mouth once daily in the morning.      methylphenidate LA (Ritalin LA) 30 mg 24 hr capsule Take 1 capsule (30 mg) by mouth once daily in the morning.      nut.tx.impaired digest fxn (Peptamen 1.5) 0.068 gram- 1.5 kcal/mL liquid Take 1 each by mouth once daily.      triamcinolone (Kenalog) 0.1 % cream APPLY TOPICALLY TO THE AFFECTED AREA 2 TO 3 TIMES DAILY      TURMERIC ORAL Take 1 each by mouth once daily.      [DISCONTINUED] esomeprazole (NexIUM Packet) 20 mg packet Take 20 mg by mouth once daily. 90 each 1     No current facility-administered medications on file prior to visit.              Objective   Visit Vitals  Pulse 78   Temp 36.7 °C (98.1 °F) (Temporal)   Resp 16   Smoking Status Never          8/3/2021     9:40 AM 11/22/2021     9:47 AM 12/12/2022     8:51 AM 7/13/2023     3:21 PM 1/23/2024    11:25 AM 3/18/2024     9:13 AM 9/27/2024     8:49 AM   Vitals   Systolic 130    126     Diastolic 82    70     Heart Rate     78 94 78   Temp 36.7 °C (98 °F) 36.4 °C (97.5 °F)   36.7 °C (98.1 °F)  36.7 °C (98.1 °F)   Resp 18      16   Height (in) 1.727 m (5' 8\") 1.727 m (5' 8\") 1.727 m (5' 8\") 1.727 m (5' 8\") 1.778 m (5' 10\") 1.727 m (5' 8\")    Weight (lb) 148.6 150 151 137 153 153    BMI 22.59 kg/m2 22.81 kg/m2 22.96 kg/m2 20.83 kg/m2 21.95 kg/m2 23.26 kg/m2    BSA (m2) 1.8 m2 1.81 m2 1.81 m2 1.73 m2 1.85 m2 1.82 m2    Visit Report     Report Report Report     Physical Exam    Erythematous eyes bilateral     Assessment & Plan  Conjunctivitis of both eyes, unspecified conjunctivitis type  Will treat with erythromycin ointment   Continue claritin  Orders:    erythromycin (Romycin) 5 mg/gram (0.5 %) ophthalmic ointment; Apply to both eyes 4 times a day for 7 days. Apply Amount per Dose: 0.5 inch (~1 cm) per dose.             "

## 2024-10-10 ENCOUNTER — APPOINTMENT (OUTPATIENT)
Dept: BEHAVIORAL HEALTH | Facility: CLINIC | Age: 29
End: 2024-10-10
Payer: MEDICAID

## 2024-10-10 DIAGNOSIS — F41.3 OTHER MIXED ANXIETY DISORDERS: ICD-10-CM

## 2024-10-10 DIAGNOSIS — Z86.59 PSYCHIATRIC FOLLOW-UP: ICD-10-CM

## 2024-10-10 DIAGNOSIS — Z09 PSYCHIATRIC FOLLOW-UP: ICD-10-CM

## 2024-10-10 DIAGNOSIS — F90.2 ATTENTION DEFICIT HYPERACTIVITY DISORDER (ADHD), COMBINED TYPE: ICD-10-CM

## 2024-10-10 DIAGNOSIS — F79 INTELLECTUAL DISABILITY: ICD-10-CM

## 2024-10-10 DIAGNOSIS — F93.0 SEPARATION ANXIETY: ICD-10-CM

## 2024-10-10 DIAGNOSIS — F84.0 AUTISM SPECTRUM DISORDER (HHS-HCC): ICD-10-CM

## 2024-10-10 PROCEDURE — 90833 PSYTX W PT W E/M 30 MIN: CPT | Performed by: STUDENT IN AN ORGANIZED HEALTH CARE EDUCATION/TRAINING PROGRAM

## 2024-10-10 PROCEDURE — 90785 PSYTX COMPLEX INTERACTIVE: CPT | Performed by: STUDENT IN AN ORGANIZED HEALTH CARE EDUCATION/TRAINING PROGRAM

## 2024-10-10 PROCEDURE — 99214 OFFICE O/P EST MOD 30 MIN: CPT | Performed by: STUDENT IN AN ORGANIZED HEALTH CARE EDUCATION/TRAINING PROGRAM

## 2024-10-10 NOTE — PROGRESS NOTES
PRESENT FOR VISIT:  -Patient  -Mother        LAST INTERVENTION  Last seen about 4 months ago in June 2024. Patient appeared to be psychiatrically and behaviorally stable. Mother reports that he did not do well at day program due to separation anxiety. Mother reported that she will be moving out of their home in a few months leaving him with a roommate and caregivers. After which he will retry day program. No medication changes.        #Interval Change  -Patient reported to have remained psychiatrically and behaviorally stable since last visit.  -No report of new issues/concerns      #ADHD  Stable. No report of significant issues with ADHD symptomology          #Behavior   Stable  No report of physical aggression, property destruction, elopement, or self-injurious behavior. Recall from prior: Occasionally grabs at mom's arm for attention. Prior to trying stimulant, had been noted to be hitting himself with his hand or hitting his head on the wall in anger.         #Anxiety/Separation Anxiety  -Baseline reassurance-seeking behavior.   -Improved separation anxiety         #Mood/Irritability   -Stable  -No report of significant changes in mood, crying spells, frequent mood swings, or significant irritability.   -No report of concern for depression from staff/family.  Recall from prior: rarely having meltdowns, maybe 1 to 3 per week. Able to calm him down a lot faster than before. Rarely lasting more 5 minutes instead of previous 15 minutes. No longer having raging anger and lashing out at others. Recall from prior, patient tends to give off a grunting noise ahead of getting angry but grunting does not always lead to anger.           #Medication  -Stable  -Endorses medication adherence.  -No report of new or significant/bothersome medication side effects.   -Mother reports feeling that current meds seem adequate and expresses preference for not making medication changes at this time.       #Health  Had recent  conjunctivitis of both eyes. Primary care prescribed erythromycin ointment  No report of hospitalizations, ED visits, or changes in non-psych medication since last visit.   Patient does not have a dentist.         REVIEW OF SYMPTOMS  -Depression: negative  -Anxiety: negative  -Psychosis: negative  -Nadia: negative  -ADHD: negative  -OCD: negative  -ASD: Deficits in social-emotional reciprocity. Some insistence on sameness. Will rearrange items in the bathroom for example. However, does not get excessively upset if prevented from doing so. Does well with transitions. Does not exhibit inflexible adherence to routines. Stereotyped/repetitive motor movements have resolved in adolescence. Loves doing puzzles, coloring, and playing [employer], but mother reports not feeling that these are obsessions.  -Aggression: does not historically behave aggressively towards others. However, on two occasions in the past two years he has grabbed his sister's arm in anger. See HPI for recent.   -Self-injury: when frustrated, occasionally hits his head on his tube feed machine or "[employer] switch/Gameboy  -Sleep: No issues reported. Getting adequate sleep and appearing well-rested. No changes from baseline. In the past did have a history of sleep issues but this resolved with CBD oil that he is no longer taking   -Appetite: No report of issues  -Medical ROS: patient does not endorse difficulty urinating, constipation, seizures, rash, or polydipsia.   *All other systems have been reviewed and are negative for complaint unless otherwise noted above           PAST PSYCHIATRIC HISTORY  Diagnosed with ASD and ID at around 20 months. Around age 13yo was briefly on Prozac for behavioral issues. At one point patient tried to open car door as mother was driving. Briefly hospitalized in [place]. Hospital assumed that to be a suicide attempt but mother disputes that. Patient was discharged on Zoloft. Soon after they discovered that patient  had GI issues. Once those were addressed, behaviors resolved and he stopped the medication. Has not been on medications since then. Has not followed with psychiatry or been hospitalized since then either. In the past has had issues with irregular sleep schedule. Mother reports having used CBD oil for that but has since stopped as sleep has since normalized.     SOCIAL HISTORY:   -Lives with mom and two adult siblings  -Family: significant involvement  -Care needs: 24/7 supervision, requires supervision with ADLs, assistance with iADLS   -Work/School: attendance suspended during COVID pandemic since March  -Guardianship: mother is guardian  -Cognitive abilities: Formal testing as a child, was told he functioned at level of a 7 to 9yo. Cannot read or write. No money management abilities.  -Does not endorse smoking, ETOH, or illicit drug use. No history of past substance abuse.  -No reported history of significant trauma   -No reported access to firearms      PAST MEDICAL HISTORY:   -Asthma  -Rumination syndrome  Around age 13 he started refusing PO intake. Required feeding tube for failure to thrive. In most recent history he has had ulcer. Patient will chew food and take liquids but he will spit it out. Refuses to swallow anything.  -Allergies: shrimp, Omeprazole (rash)  -No reported history of seizures  -No reported history of cardiac issues  -Had basic genetic workup around age 4yo to rule out things like Fragile X      FAMILY HISTORY  -No reported family history of suicide  -Brother with depression, two sisters with anxiety  -Has a second cousin with ASD  -No reported family history of early sudden cardiac arrest        ____________________________________  Psychotherapy provided   -Provided 20 minutes of psychotherapy to patient and/or family/caregivers    -Psychotherapeutic interventions utilized: Supportive, Parent/Caregiver Training  -Target issues/Main topics discussed: psychiatric symptoms, behavior, daily  life, sleep, hobbies/interests, advanced care planning, upcoming changes in living situation, seeking roommate  -Response to therapy: actively participated and responded favorably to the above psychotherapeutic interventions           ____________________________________  Mental Status Exam  Appearance: adequate grooming  Eye Contact: avoidant     Demeanor: guarded  Motor Activity: Average, no PMA/PMR.  Musculoskeletal: No TD, tics, or tremor appreciated. AIMS not assessed this visit due to technological limitations. Last AIMS score 0.   Mood: anxious  Affect: restricted    Speech: limited verbal expression. Prolonged latency, stereotyped/repetitive speech. Use of gestures to make needs/wants known.  Thought Process: [place]. Generally goal directed. Associations are logical in context of patient's developmental level. But assessment limited in setting of intellectual/developmental disability with impaired communication abilities.   Thought Content: Unable to fully assess given patient with intellectual/developmental disability and/or impaired communication abilities.  Thought Perception: Does not appear to be responding to hallucinatory stimuli.   Orientation: alert, oriented to person and general situation  Attention/Concentration: distractable  Cognition: intellectual disability   Insight: impaired, in setting of intellectual/developmental disability  Judgement: impaired, in setting of intellectual/developmental disability          ____________________________________  RISK ASSESSMENT   Patient is currently felt to be at low acute and imminent risk of harm to self and others. However, chronic risk is elevated given history of self-harming behavior and aggression towards others. He does not currently meet criteria for inpatient psychiatric hospitalization given that he does not exhibit evidence of significant deterioration in baseline psychiatric illness, aggression, self-injury, and ability to care for self.  There is no evidence that patient's current caregivers/living environment are unable to safely manage patient's behavior. Overall risk mitigated by continued psychiatric follow-up care, psychopharmacologic intervention, 24/7 supervision, and [facility] services. Patient/caregivers are aware of emergency psychiatric resources available in the event of an acute psychiatric emergency, Mobile Crisis, 911, and local ER.         ____________________________________  IMPRESSION  Male with history of autism spectrum disorder, intellectual disability, anxiety, and ADHD presenting for ongoing management of psychotropic medication.         ###      As of this appointment:  -No report of significant change in patient's overall psychiatric and behavioral condition since last visit.  -No report of significant new issues/concerns.   -Appears to be tolerating medication regimen without report of significant or bothersome side effects.   -Will continue current medication regimen and plan for follow-up in about 3 to 4 months  ###           Diagnoses:  -ADHD  -Other Mixed Anxiety Disorder  -Separation Anxiety Disorder  -Autism Spectrum Disorder  -Intellectual Disability        PLAN / MANAGEMENT / RECOMMENDATIONS      #Visit Complexity  -Visit of high complexity given patient's intellectual/developmental disability and/or impaired communication abilities resulting in need for the presence of a third party (mother) to provide clinical information and history.      #Medication Management  -Continue scheduled Clonidine 0.1mg tid (8:00, 12:00, 20:00)        #Medication Considerations  -Medication consent/assent:   Risks, benefits, alternatives, off-label uses, black box warnings, and frequent/important side effects of medications have been discussed with patient/caregiver. To the extent possible, they have voiced understanding and agreement with recommended use of psychotropic medication.     -Medical necessity for continued treatment  with psychotropic medication:      [] Symptom reduction        [] Improvement in functioning      [x] Maintenance therapy to reduce risk of harm to self/others      [x] Maintenance therapy to prevent deterioration in functioning      -Rational for not reducing medication dose at this time:           [x] Significant risk of deterioration in functioning       [] Concern for elevating risk of harm to self/others      [x] Prior dose reduction unsuccessful and/or harmful      [] Psychiatric/behavioral condition not adequately stabilized/optimized       [] Medication regimen recently modified          -OARRS  --I have personally reviewed patient's OARRS report.       -Risk/Benefit assessment:  There is no report of signs/symptoms consistent with medication-induced impairment in daily functioning. At this time, benefits of medication felt to outweigh potential risks. But we will continue to reassess need for psychotropic medication at regular 3 to 6 month intervals, or sooner as clinically indicated.          #Medication Monitoring Plan:   -Patient not currently taking medications requiring routine laboratory monitoring         #MDM/Complexity Issues    [] Chronic medical comorbidities     [x] Chronic risk of harm to self/others     [x] Intellectual/Developmental disability     [x] Need for independent historian due to intellectual/developmental disability and/or impaired communication abilities     [] Controlled substance medication requiring regular monitoring     [] Medication requiring significant ongoing monitoring for toxicity       #Counseling Provided     [x] Diagnostic impression/prognosis      [x] Risks and benefits of treatment options     [x] Instruction for management/treatment and follow-up     [x] Educated patient/caregiver about: behavioral interventions, sleep hygiene, safety planning                  #Coordination of care provided    [x] Family    [] Caregiver/DSP/Staff       []Agency supervisor       []  Nursing staff      [] SSA/          []Therapist                     [] Guardian          #Follow-up      -Follow-up in about 3 or 4 months, or sooner if new/worsening symptoms           Time  -Prep time on date of the patient encounter: 10 minutes  -Time spent directly with patient/family/caregiver: 40 minutes  -Additional time spent on patient care activities: 10 minutes  -Documentation time: 10 minutes  -Total time on date of patient encounter: 70 minutes       Scribe Attestation  By signing my name below, IOlivia, Scribe   attest that this documentation has been prepared under the direction and in the presence of Leyla Bell DO.

## 2024-10-24 ENCOUNTER — TELEPHONE (OUTPATIENT)
Dept: PRIMARY CARE | Facility: CLINIC | Age: 29
End: 2024-10-24
Payer: MEDICAID

## 2024-10-24 DIAGNOSIS — H10.9 CONJUNCTIVITIS OF BOTH EYES, UNSPECIFIED CONJUNCTIVITIS TYPE: Primary | ICD-10-CM

## 2024-10-24 RX ORDER — OFLOXACIN 3 MG/ML
2 SOLUTION/ DROPS OPHTHALMIC 4 TIMES DAILY
Qty: 5 ML | Refills: 2 | Status: SHIPPED | OUTPATIENT
Start: 2024-10-24 | End: 2024-11-03

## 2024-10-24 NOTE — TELEPHONE ENCOUNTER
Mom calling back advising eye ointment not working was told to call in to ask for eye drops to be called in.     Thanks

## 2024-11-11 DIAGNOSIS — R13.10 DYSPHAGIA, UNSPECIFIED TYPE: ICD-10-CM

## 2024-11-11 RX ORDER — CLONIDINE HYDROCHLORIDE 0.1 MG/1
TABLET ORAL
Qty: 270 TABLET | Refills: 1 | Status: SHIPPED | OUTPATIENT
Start: 2024-11-11

## 2024-11-12 ENCOUNTER — TELEPHONE (OUTPATIENT)
Dept: PRIMARY CARE | Facility: CLINIC | Age: 29
End: 2024-11-12
Payer: MEDICAID

## 2024-11-12 RX ORDER — ESOMEPRAZOLE MAGNESIUM 20 MG/1
20 GRANULE, DELAYED RELEASE ORAL DAILY
Qty: 90 EACH | Refills: 0 | Status: SHIPPED | OUTPATIENT
Start: 2024-11-12

## 2024-11-12 NOTE — TELEPHONE ENCOUNTER
Mom calling advising she could only use eye drops for two days, his other eye swelled up thinks maybe allergic reaction to eye drops? Do you want to see him or still she eye doctor? ?

## 2024-11-22 ENCOUNTER — PATIENT MESSAGE (OUTPATIENT)
Dept: PRIMARY CARE | Facility: CLINIC | Age: 29
End: 2024-11-22
Payer: MEDICAID

## 2024-11-22 DIAGNOSIS — H10.9 CONJUNCTIVITIS OF BOTH EYES, UNSPECIFIED CONJUNCTIVITIS TYPE: ICD-10-CM

## 2024-11-22 DIAGNOSIS — F84.0 AUTISM SPECTRUM DISORDER (HHS-HCC): Primary | ICD-10-CM

## 2024-11-22 DIAGNOSIS — F79 INTELLECTUAL DISABILITY: ICD-10-CM

## 2024-11-22 DIAGNOSIS — J30.1 ALLERGIC RHINITIS DUE TO POLLEN, UNSPECIFIED SEASONALITY: ICD-10-CM

## 2024-11-22 DIAGNOSIS — F90.2 ATTENTION DEFICIT HYPERACTIVITY DISORDER (ADHD), COMBINED TYPE: ICD-10-CM

## 2025-01-03 DIAGNOSIS — R13.10 DYSPHAGIA, UNSPECIFIED TYPE: ICD-10-CM

## 2025-01-03 RX ORDER — ESOMEPRAZOLE MAGNESIUM 20 MG/1
20 GRANULE, DELAYED RELEASE ORAL DAILY
Qty: 90 EACH | Refills: 0 | Status: SHIPPED | OUTPATIENT
Start: 2025-01-03

## 2025-01-20 PROBLEM — Z09 PSYCHIATRIC FOLLOW-UP: Status: ACTIVE | Noted: 2025-01-20

## 2025-01-20 PROBLEM — Z86.59 PSYCHIATRIC FOLLOW-UP: Status: ACTIVE | Noted: 2025-01-20

## 2025-01-20 PROBLEM — F93.0 SEPARATION ANXIETY: Status: ACTIVE | Noted: 2025-01-20

## 2025-01-20 PROBLEM — F41.3 OTHER MIXED ANXIETY DISORDERS: Status: ACTIVE | Noted: 2025-01-20

## 2025-01-22 DIAGNOSIS — Z86.59 PSYCHIATRIC FOLLOW-UP: ICD-10-CM

## 2025-01-22 DIAGNOSIS — Z09 PSYCHIATRIC FOLLOW-UP: ICD-10-CM

## 2025-01-23 ENCOUNTER — LAB (OUTPATIENT)
Dept: LAB | Facility: LAB | Age: 30
End: 2025-01-23
Payer: MEDICAID

## 2025-01-23 DIAGNOSIS — D75.839 THROMBOCYTOSIS: ICD-10-CM

## 2025-01-23 DIAGNOSIS — F79 INTELLECTUAL DISABILITY: ICD-10-CM

## 2025-01-23 DIAGNOSIS — F90.2 ATTENTION DEFICIT HYPERACTIVITY DISORDER (ADHD), COMBINED TYPE: ICD-10-CM

## 2025-01-23 DIAGNOSIS — F84.0 AUTISM SPECTRUM DISORDER (HHS-HCC): ICD-10-CM

## 2025-01-23 DIAGNOSIS — J30.1 ALLERGIC RHINITIS DUE TO POLLEN, UNSPECIFIED SEASONALITY: ICD-10-CM

## 2025-01-23 DIAGNOSIS — H10.9 CONJUNCTIVITIS OF BOTH EYES, UNSPECIFIED CONJUNCTIVITIS TYPE: ICD-10-CM

## 2025-01-23 LAB
25(OH)D3 SERPL-MCNC: 28 NG/ML (ref 30–100)
ALBUMIN SERPL BCP-MCNC: 5.2 G/DL (ref 3.4–5)
ALP SERPL-CCNC: 74 U/L (ref 33–120)
ALT SERPL W P-5'-P-CCNC: 19 U/L (ref 10–52)
ANION GAP SERPL CALC-SCNC: 11 MMOL/L (ref 10–20)
AST SERPL W P-5'-P-CCNC: 23 U/L (ref 9–39)
BASOPHILS # BLD AUTO: 0.03 X10*3/UL (ref 0–0.1)
BASOPHILS NFR BLD AUTO: 0.7 %
BILIRUB SERPL-MCNC: 0.3 MG/DL (ref 0–1.2)
BUN SERPL-MCNC: 17 MG/DL (ref 6–23)
CALCIUM SERPL-MCNC: 10.5 MG/DL (ref 8.6–10.3)
CHLORIDE SERPL-SCNC: 100 MMOL/L (ref 98–107)
CHOLEST SERPL-MCNC: 227 MG/DL (ref 0–199)
CHOLESTEROL/HDL RATIO: 5.1
CO2 SERPL-SCNC: 30 MMOL/L (ref 21–32)
CREAT SERPL-MCNC: 0.69 MG/DL (ref 0.5–1.3)
CRP SERPL-MCNC: <0.1 MG/DL
EGFRCR SERPLBLD CKD-EPI 2021: >90 ML/MIN/1.73M*2
EOSINOPHIL # BLD AUTO: 0.35 X10*3/UL (ref 0–0.7)
EOSINOPHIL NFR BLD AUTO: 7.9 %
ERYTHROCYTE [DISTWIDTH] IN BLOOD BY AUTOMATED COUNT: 13.3 % (ref 11.5–14.5)
ERYTHROCYTE [SEDIMENTATION RATE] IN BLOOD BY WESTERGREN METHOD: 15 MM/H (ref 0–15)
FERRITIN SERPL-MCNC: 70 NG/ML (ref 20–300)
FOLATE SERPL-MCNC: >22.3 NG/ML
GLUCOSE SERPL-MCNC: 80 MG/DL (ref 74–99)
HCT VFR BLD AUTO: 47.7 % (ref 41–52)
HDLC SERPL-MCNC: 44.3 MG/DL
HGB BLD-MCNC: 15.4 G/DL (ref 13.5–17.5)
IMM GRANULOCYTES # BLD AUTO: 0.01 X10*3/UL (ref 0–0.7)
IMM GRANULOCYTES NFR BLD AUTO: 0.2 % (ref 0–0.9)
IRON SATN MFR SERPL: 39 % (ref 25–45)
IRON SERPL-MCNC: 168 UG/DL (ref 35–150)
LDLC SERPL CALC-MCNC: 144 MG/DL
LYMPHOCYTES # BLD AUTO: 2.23 X10*3/UL (ref 1.2–4.8)
LYMPHOCYTES NFR BLD AUTO: 50.2 %
MAGNESIUM SERPL-MCNC: 2.13 MG/DL (ref 1.6–2.4)
MCH RBC QN AUTO: 28.5 PG (ref 26–34)
MCHC RBC AUTO-ENTMCNC: 32.3 G/DL (ref 32–36)
MCV RBC AUTO: 88 FL (ref 80–100)
MONOCYTES # BLD AUTO: 0.43 X10*3/UL (ref 0.1–1)
MONOCYTES NFR BLD AUTO: 9.7 %
NEUTROPHILS # BLD AUTO: 1.39 X10*3/UL (ref 1.2–7.7)
NEUTROPHILS NFR BLD AUTO: 31.3 %
NON HDL CHOLESTEROL: 183 MG/DL (ref 0–149)
NRBC BLD-RTO: 0 /100 WBCS (ref 0–0)
PHOSPHATE SERPL-MCNC: 3.6 MG/DL (ref 2.5–4.9)
PLATELET # BLD AUTO: 395 X10*3/UL (ref 150–450)
POTASSIUM SERPL-SCNC: 4.4 MMOL/L (ref 3.5–5.3)
PREALB SERPL-MCNC: 43.8 MG/DL (ref 18–40)
PROT SERPL-MCNC: 8.1 G/DL (ref 6.4–8.2)
RBC # BLD AUTO: 5.4 X10*6/UL (ref 4.5–5.9)
SODIUM SERPL-SCNC: 137 MMOL/L (ref 136–145)
TIBC SERPL-MCNC: 427 UG/DL (ref 240–445)
TRIGL SERPL-MCNC: 193 MG/DL (ref 0–149)
UIBC SERPL-MCNC: 259 UG/DL (ref 110–370)
VIT B12 SERPL-MCNC: 2608 PG/ML (ref 211–911)
VLDL: 39 MG/DL (ref 0–40)
WBC # BLD AUTO: 4.4 X10*3/UL (ref 4.4–11.3)

## 2025-01-23 PROCEDURE — 82525 ASSAY OF COPPER: CPT

## 2025-01-23 PROCEDURE — 83550 IRON BINDING TEST: CPT

## 2025-01-23 PROCEDURE — 82607 VITAMIN B-12: CPT

## 2025-01-23 PROCEDURE — 80061 LIPID PANEL: CPT

## 2025-01-23 PROCEDURE — 84100 ASSAY OF PHOSPHORUS: CPT

## 2025-01-23 PROCEDURE — 84207 ASSAY OF VITAMIN B-6: CPT

## 2025-01-23 PROCEDURE — 84425 ASSAY OF VITAMIN B-1: CPT

## 2025-01-23 PROCEDURE — 36415 COLL VENOUS BLD VENIPUNCTURE: CPT

## 2025-01-23 PROCEDURE — 84134 ASSAY OF PREALBUMIN: CPT

## 2025-01-23 PROCEDURE — 85025 COMPLETE CBC W/AUTO DIFF WBC: CPT

## 2025-01-23 PROCEDURE — 84630 ASSAY OF ZINC: CPT

## 2025-01-23 PROCEDURE — 85652 RBC SED RATE AUTOMATED: CPT

## 2025-01-23 PROCEDURE — 83540 ASSAY OF IRON: CPT

## 2025-01-23 PROCEDURE — 82746 ASSAY OF FOLIC ACID SERUM: CPT

## 2025-01-23 PROCEDURE — 82306 VITAMIN D 25 HYDROXY: CPT

## 2025-01-23 PROCEDURE — 82728 ASSAY OF FERRITIN: CPT

## 2025-01-23 PROCEDURE — 86140 C-REACTIVE PROTEIN: CPT

## 2025-01-23 PROCEDURE — 80053 COMPREHEN METABOLIC PANEL: CPT

## 2025-01-23 PROCEDURE — 83735 ASSAY OF MAGNESIUM: CPT

## 2025-01-26 LAB
COPPER SERPL-MCNC: 108.2 UG/DL (ref 70–140)
ZINC SERPL-MCNC: 109.4 UG/DL (ref 60–120)

## 2025-01-27 LAB
PYRIDOXAL PHOS SERPL-SCNC: 193.3 NMOL/L (ref 20–125)
VIT B1 PYROPHOSHATE BLD-SCNC: 157 NMOL/L (ref 70–180)

## 2025-01-28 DIAGNOSIS — Z93.1 PEG (PERCUTANEOUS ENDOSCOPIC GASTROSTOMY) STATUS (MULTI): Primary | ICD-10-CM

## 2025-02-10 PROBLEM — Z93.1 PEG (PERCUTANEOUS ENDOSCOPIC GASTROSTOMY) STATUS (MULTI): Status: ACTIVE | Noted: 2025-02-10

## 2025-02-10 NOTE — PROGRESS NOTES
"Nutrition Initial Assessment:     Patient Norma Salinas is a 29 y.o. male being seen via virtual visit, phone call only who was referred by Dr. Nieto on 1/28/25 for   1. PEG (percutaneous endoscopic gastrostomy) status (Multi)            Nutrition Assessment    Patient Active Problem List   Diagnosis    Attention deficit hyperactivity disorder (ADHD), combined type    Autism spectrum disorder ([facility])    Intellectual disability    Other mixed anxiety disorders    Separation anxiety    Psychiatric follow-up    PEG (percutaneous endoscopic gastrostomy) status (Multi)       Nutrition History:  Food & Nutrition History: Pt has been taking Peptamen 1.5 for the past 5 years, His B12 is high now. Per CW: 138#, she thinks is perfect at this weight.  Mother would like to swtich formula. He does drink some water with lunch and dinner and apple juice but spits most of it out.  Current TF provides 2625 juan 110 gram of protein, 1351 free H20 plus additional 1820 H20 for total of - 3171 ml of H20  Vitamin/mineral intake:      Herbal supplements:    Medication and Complementary/Alternative Medicine Use:    GI Symptoms: Loose stool (once a day loose, has 2-3 BM)  Sleep Habits: 7+ hrs continuous        Enteral Nutrition History:   Enteral Nutrition Formula/Solution: Peptamen 1.5Peptamen 1.5 7 cans/day - 2625 juan, 119 gram of protein, 21 mcg B 12, 3171 ml total h20  Method of TF administration:  5 feedings per day, 3 times per day  TF infusion rate: 400 hr   Feeding Tube Flush:  60 ml after feeds, 60 ml w/meds - 2x/day, plus 200 ml with feeds        Physical Activity:   very active during the day      Anthropometrics:  Height: 172.7 cm (5' 8\")                         Weight History:   Daily Weight  03/18/24 : 69.4 kg (153 lb)  01/23/24 : 69.4 kg (153 lb)  07/13/23 : 62.1 kg (137 lb)  12/12/22 : 68.5 kg (151 lb)  11/22/21 : 68 kg (150 lb)  08/03/21 : 67.4 kg (148 lb 9.6 oz)  02/11/20 : 56.2 kg (124 lb)    Weight Change %: 29# " weight gain x 4 years, no recent weight available, stable in 2024       Nutrition Focused Physical Exam Findings:  Subcutaneous Fat Loss:   Defer Subcutaneous Fat Loss Assessment: Defer all    Muscle Wasting:  Defer Muscle Wasting Assessment: Defer all    Physical Findings:  Hair:    Eyes:    Nails:    Skin:    Respiratory:    Edema:        Nutrition Significant Labs:  CMP Trend:    Recent Labs     01/23/25  0906 01/23/24  1153 09/01/22  0901   GLUCOSE 80 78 81    138 138   K 4.4 4.6 4.6    99 102   CO2 30 29 30   ANIONGAP 11 15 11   BUN 17 17 16   CREATININE 0.69 0.64 0.66   EGFR >90 >90  --    CALCIUM 10.5* 10.2 10.3   ALBUMIN 5.2* 5.1* 5.1*   ALKPHOS 74 75 78   PROT 8.1 7.9 7.8   AST 23 27 25   BILITOT 0.3 0.3 0.3   ALT 19 28 27   , CBC Trend:   Recent Labs     01/23/25  0906 01/23/24  1153 09/01/22  0901   WBC 4.4 5.4 4.8   NRBC 0.0 0.0  --    RBC 5.40 5.43 5.42   HGB 15.4 16.0 15.7   HCT 47.7 47.7 48.5   MCV 88 88 89   MCH 28.5 29.5  --    MCHC 32.3 33.5 32.4   RDW 13.3 13.2 13.2    462* 404   , RFP + Serum Mag Trend:   Recent Labs     01/23/25  0906 01/23/24  1153 09/01/22  0901 02/11/20  0934   GLUCOSE 80 78 81 82    138 138 140   K 4.4 4.6 4.6 4.2    99 102 102   CO2 30 29 30 29   ANIONGAP 11 15 11 13   BUN 17 17 16 22   CREATININE 0.69 0.64 0.66 0.67   EGFR >90 >90  --   --    CALCIUM 10.5* 10.2 10.3 10.1   PHOS 3.6  --   --   --    ALBUMIN 5.2* 5.1* 5.1* 5.1*   MG 2.13  --  2.31 2.26   , DM Specific Labs Trend (includes HgbA1C):   Recent Labs     01/23/24 1153   HGBA1C 5.6   , Lipid Panel Trend:    Recent Labs     01/23/25  0906 01/23/24  1153 09/01/22  0901 02/11/20  0934   CHOL 227* 222* 219* 182   HDL 44.3 40.4 41.0 48.1   LDLCALC 144* 141*  --   --    LDLF  --   --  129* 109   VLDL 39 41* 49* 25   TRIG 193* 204* 246* 126   , Iron Panel + Serum Ferritin Trend:   Recent Labs     01/23/25  0906 09/01/22  0901 02/11/20  0934   IRON 168* 134  --    UIBC 259  --   --    TIBC  427 419  --    IRONSAT 39 32  --    FERRITIN 70 74 40   , Vitamin B12:   Lab Results   Component Value Date    SCZNNFMI81 2,608 (H) 01/23/2025    , Folate:   Lab Results   Component Value Date    FOLATE >22.3 01/23/2025    , and Vitamin D:   Lab Results   Component Value Date    VITD25 28 (L) 01/23/2025        Medications:  Current Outpatient Medications   Medication Instructions    ascorbic acid/vitamin E (VITAMIN C-VITAMIN E ORAL) 1 each, oral, Daily    B.animalis,bifid,infantis,long (PROBIOTIC 4X ORAL) 1 each, oral, Daily    cholecalciferol (VITAMIN D-3) 125 mcg, oral, Daily    cloNIDine (Catapres) 0.1 mg tablet Take one tablet (0.1mg) scheduled three times a day - in morning (8:00am), midday (12:00pm), and evening (8:00pm) for anxiety/ADHD [F41.3/F90.2]    esomeprazole (NEXIUM PACKET) 20 mg, oral, Daily, LAST REFILL TILL Appt IS MADE    FLUoxetine (PROZAC) 10 mg, oral, Daily    fluticasone (Flonase) 50 mcg/actuation nasal spray 1 spray, Each Nostril, Daily, Shake gently. Before first use, prime pump. After use, clean tip and replace cap.    methylphenidate LA (RITALIN LA) 20 mg, oral, Every morning    methylphenidate LA (RITALIN LA) 30 mg, oral, Every morning    nut.tx.impaired digest fxn (Peptamen 1.5) 0.068 gram- 1.5 kcal/mL liquid 1 each, oral, Daily    triamcinolone (Kenalog) 0.1 % cream APPLY TOPICALLY TO THE AFFECTED AREA 2 TO 3 TIMES DAILY    TURMERIC ORAL 1 each, oral, Daily        Estimated Needs:  Total Energy Estimated Needs in 24 hours (kCal): 2200 kCal;    Total Protein Estimated Needs in 24 Hours (g): 94 g;     ;    Total Fluid Estimated Needs in 24 Hours (mL): 2200 mL;       Patient on Order Fluid Restriction: No         Nutrition Diagnosis        Nutrition Diagnosis  Patient has Nutrition Diagnosis: Yes  Diagnosis Status (1): New  Nutrition Diagnosis 1: Inadequate oral intake  Related to (1): medical history  As Evidenced by (1): pt is recevoing 100% of est protein and calories needs via PEG        Nutrition Interventions:    1) Rec the following    PlaceFull 1.5 - 5 cartons per day  2500 juan,  120 grams of protein, 1824 ml of free water    2) Feed at current rate - 5x a day, 400 ml/hour    3) 60 ml after feeds and meds - 180 ml total    4)  Can continue with 200 ml H20 with each feed- 3004 ml total or can decrease to 150 ml w/feeds - 2754 ml H20    Nutrition Goals:  Nutrition Goals : tf tolerate, no further weight loss, additional weight gain may be beneficial     Nutrition Recommendations:  1)  Monitor BM as recommended formula can cause more loose stool          Follow Up: Patient declined nutrition follow up appointment. This service will remain available if patient wishes to schedule a follow up. Referral is good for 1 year (expires on 1/29/26). If patient wishes to schedule a follow up, can call Nutrition Scheduling Line at [phone].

## 2025-02-12 ENCOUNTER — TELEMEDICINE CLINICAL SUPPORT (OUTPATIENT)
Dept: NUTRITION | Facility: CLINIC | Age: 30
End: 2025-02-12
Payer: MEDICAID

## 2025-02-12 VITALS — HEIGHT: 68 IN | BODY MASS INDEX: 23.26 KG/M2

## 2025-02-12 DIAGNOSIS — Z93.1 PEG (PERCUTANEOUS ENDOSCOPIC GASTROSTOMY) STATUS (MULTI): Primary | ICD-10-CM

## 2025-02-12 PROCEDURE — 97802 MEDICAL NUTRITION INDIV IN: CPT | Mod: GT | Performed by: INTERNAL MEDICINE

## 2025-02-13 ENCOUNTER — TELEMEDICINE (OUTPATIENT)
Dept: PRIMARY CARE | Facility: CLINIC | Age: 30
End: 2025-02-13
Payer: MEDICAID

## 2025-02-13 DIAGNOSIS — R50.9 FEVER, UNSPECIFIED FEVER CAUSE: Primary | ICD-10-CM

## 2025-02-13 PROCEDURE — 99213 OFFICE O/P EST LOW 20 MIN: CPT | Performed by: FAMILY MEDICINE

## 2025-02-13 RX ORDER — AMOXICILLIN 875 MG/1
875 TABLET, FILM COATED ORAL 2 TIMES DAILY
Qty: 14 TABLET | Refills: 0 | Status: SHIPPED | OUTPATIENT
Start: 2025-02-13 | End: 2025-02-20

## 2025-02-13 ASSESSMENT — ENCOUNTER SYMPTOMS
NAUSEA: 0
HEADACHES: 0
WHEEZING: 0
DIARRHEA: 0
FEVER: 1
DYSURIA: 0
COUGH: 0
ABDOMINAL PAIN: 0
VOMITING: 0
SORE THROAT: 0

## 2025-02-13 NOTE — PROGRESS NOTES
Virtual or Telephone Consent    An interactive audio and video telecommunication system which permits real time communications between the patient (at the originating site) and provider (at the distant site) was utilized to provide this telehealth service.     Patient is sepcial needs, and limited mental capacities , we talked with his mother.   \Answers submitted by the patient for this visit:  Fever Questionnaire (Submitted on 2/13/2025)  Chief Complaint: Fever  Chronicity: recurrent  Onset: 1 to 4 weeks ago  contaminated food: No  contaminated water: No  history of cancer: Yes  occupational exposure: No  recent travel: No  immunosuppression: No  recent illness: Yes  sick contacts: No  Frequency: every several days  Progression since onset: waxing and waning  Max temp prior to arrival: 101 to 101.9 F  Temperature source: a tympanic thermometer  abdominal pain: No  chest pain: No  congestion: No  cough: No  diarrhea: No  ear pain: No  headaches: No  muscle aches: No  nausea: No  rash: No  sleepiness: No  sore throat: No  urinary pain: No  vomiting: No  wheezing: No  Subjective   Patient ID: Norma Salinas is a 29 y.o. male who presents for No chief complaint on file..  Fever   This is a recurrent problem. The current episode started 1 to 4 weeks ago. The problem occurs every several days. The problem has been waxing and waning. The maximum temperature noted was 101 to 101.9 F. The temperature was taken using a tympanic thermometer. Pertinent negatives include no abdominal pain, chest pain, congestion, coughing, diarrhea, ear pain, headaches, muscle aches, nausea, rash, sleepiness, sore throat, urinary pain, vomiting or wheezing.   Risk factors: hx of cancer and recent sickness    Risk factors: no contaminated food, no contaminated water, no immunosuppression, no occupational exposure, no recent travel and no sick contacts      [place] eye is not as bad   The ophthalmologist did not look it is bad   She gave him  antibitoics   Low grade fevcer, some   Give him motrin   Bacterial in the eye   2 care givers they have to cook his meal   Not sure if they always clean her eyes , strange, low grade fever.   No signs of infection any way else  No other sine of infection   No signs of sinus infection or green mucous from his nose.   Sneezing a little bit over the couple of days , no draninge from his nose.     Review of Systems   Constitutional:  Positive for fever.   HENT:  Negative for congestion, ear pain and sore throat.    Respiratory:  Negative for cough and wheezing.    Cardiovascular:  Negative for chest pain.   Gastrointestinal:  Negative for abdominal pain, diarrhea, nausea and vomiting.   Genitourinary:  Negative for dysuria.   Skin:  Negative for rash.   Neurological:  Negative for headaches.       Past Medical History:   Diagnosis Date    Personal history of other diseases of the respiratory system 04/25/2018    History of sore throat    Personal history of other diseases of the respiratory system     History of asthma    Personal history of other mental and behavioral disorders     History of autism       Past Surgical History:   Procedure Laterality Date    OTHER SURGICAL HISTORY  02/11/2020    Esophagogastroduodenoscopy    OTHER SURGICAL HISTORY  02/11/2020    Percutaneous endoscopic jejunostomy tube insertion      Social History     Socioeconomic History    Marital status: Single   Tobacco Use    Smoking status: Never    Smokeless tobacco: Never   Substance and Sexual Activity    Alcohol use: Never    Drug use: Never      No family history on file.    MEDICATIONS AND ALLERGIES:    ALLERGIES Omeprazole, Other, and Shrimp    MEDICATIONS   Current Outpatient Medications on File Prior to Visit   Medication Sig Dispense Refill    ascorbic acid/vitamin E (VITAMIN C-VITAMIN E ORAL) Take 1 each by mouth once daily.      B.animalis,bifid,infantis,long (PROBIOTIC 4X ORAL) Take 1 each by mouth once daily.      cholecalciferol  "(Vitamin D-3) 125 MCG (5000 UT) capsule Take 1 capsule (125 mcg) by mouth once daily.      cloNIDine (Catapres) 0.1 mg tablet Take one tablet (0.1mg) scheduled three times a day - in morning (8:00am), midday (12:00pm), and evening (8:00pm) for anxiety/ADHD [F41.3/F90.2] 270 tablet 1    esomeprazole (NexIUM Packet) 20 mg packet Take 20 mg by mouth once daily. LAST REFILL TILL Appt IS MADE 90 each 0    FLUoxetine (PROzac) 10 mg tablet Take 1 tablet (10 mg) by mouth once daily.      fluticasone (Flonase) 50 mcg/actuation nasal spray Administer 1 spray into each nostril once daily. Shake gently. Before first use, prime pump. After use, clean tip and replace cap. 16 g 11    methylphenidate LA (Ritalin LA) 20 mg 24 hr capsule Take 1 capsule (20 mg) by mouth once daily in the morning.      methylphenidate LA (Ritalin LA) 30 mg 24 hr capsule Take 1 capsule (30 mg) by mouth once daily in the morning.      nut.tx.impaired digest fxn (Peptamen 1.5) 0.068 gram- 1.5 kcal/mL liquid Take 1 each by mouth once daily.      triamcinolone (Kenalog) 0.1 % cream APPLY TOPICALLY TO THE AFFECTED AREA 2 TO 3 TIMES DAILY      TURMERIC ORAL Take 1 each by mouth once daily.       No current facility-administered medications on file prior to visit.              Objective   Visit Vitals  Smoking Status Never          11/22/2021     9:47 AM 12/12/2022     8:51 AM 7/13/2023     3:21 PM 1/23/2024    11:25 AM 3/18/2024     9:13 AM 9/27/2024     8:49 AM 2/12/2025    10:32 AM   Vitals   Systolic    126      Diastolic    70      BP Location    Left arm      Heart Rate    78 94 78    Temp 36.4 °C (97.5 °F)   36.7 °C (98.1 °F)  36.7 °C (98.1 °F)    Resp      16    Height 1.727 m (5' 8\") 1.727 m (5' 8\") 1.727 m (5' 8\") 1.778 m (5' 10\") 1.727 m (5' 8\")  1.727 m (5' 8\")   Weight (lb) 150 151 137 153 153     BMI 22.81 kg/m2 22.96 kg/m2 20.83 kg/m2 21.95 kg/m2 23.26 kg/m2  23.26 kg/m2   BSA (m2) 1.81 m2 1.81 m2 1.73 m2 1.85 m2 1.82 m2  1.82 m2   Visit Report  "   Report Report Report      Physical Exam        Assessment & Plan  Fever, unspecified fever cause  We discussed the case with his mom who is the health care proxy   Will treat with amox/clav   If nto getting better advised further testing with blood workup and imaging   He recently saw ophthalmologist   Alarming signs dciussed, advised to take him to the ED if not getting better , mother verbalize dunderstnaidng   Advised to check for any other symptoms of infection   So far, he is stable , tolerating diet,no change in bowel mvt, no signs of infection around the tubes, no changes in activity level.   Orders:    amoxicillin (Amoxil) 875 mg tablet; Take 1 tablet (875 mg) by mouth 2 times a day for 7 days.

## 2025-02-14 ASSESSMENT — ENCOUNTER SYMPTOMS
DYSURIA: 0
VOMITING: 0
COUGH: 0
HEADACHES: 0
NAUSEA: 0
DIARRHEA: 0
FEVER: 1
SORE THROAT: 0
ABDOMINAL PAIN: 0
WHEEZING: 0

## 2025-02-27 ENCOUNTER — APPOINTMENT (OUTPATIENT)
Dept: BEHAVIORAL HEALTH | Facility: CLINIC | Age: 30
End: 2025-02-27
Payer: MEDICAID

## 2025-02-27 DIAGNOSIS — Z86.59 PSYCHIATRIC FOLLOW-UP: ICD-10-CM

## 2025-02-27 DIAGNOSIS — F84.0 AUTISM SPECTRUM DISORDER (HHS-HCC): ICD-10-CM

## 2025-02-27 DIAGNOSIS — F93.0 SEPARATION ANXIETY: ICD-10-CM

## 2025-02-27 DIAGNOSIS — F90.2 ATTENTION DEFICIT HYPERACTIVITY DISORDER (ADHD), COMBINED TYPE: ICD-10-CM

## 2025-02-27 DIAGNOSIS — Z09 PSYCHIATRIC FOLLOW-UP: ICD-10-CM

## 2025-02-27 DIAGNOSIS — F79 INTELLECTUAL DISABILITY: ICD-10-CM

## 2025-02-27 DIAGNOSIS — F41.3 OTHER MIXED ANXIETY DISORDERS: ICD-10-CM

## 2025-02-27 PROCEDURE — 90785 PSYTX COMPLEX INTERACTIVE: CPT | Performed by: STUDENT IN AN ORGANIZED HEALTH CARE EDUCATION/TRAINING PROGRAM

## 2025-02-27 PROCEDURE — 99214 OFFICE O/P EST MOD 30 MIN: CPT | Performed by: STUDENT IN AN ORGANIZED HEALTH CARE EDUCATION/TRAINING PROGRAM

## 2025-02-27 PROCEDURE — 90833 PSYTX W PT W E/M 30 MIN: CPT | Performed by: STUDENT IN AN ORGANIZED HEALTH CARE EDUCATION/TRAINING PROGRAM

## 2025-02-27 RX ORDER — CLONIDINE HYDROCHLORIDE 0.1 MG/1
TABLET ORAL
Qty: 270 TABLET | Refills: 1 | Status: SHIPPED | OUTPATIENT
Start: 2025-02-27

## 2025-02-27 NOTE — PROGRESS NOTES
Others Attending Appointment:  -Mother  *Presence necessary as independent historian given patient's intellectual/developmental disability and/or impaired communication abilities        LAST INTERVENTION  Last seen about 4 months ago in October 2024. No report of significant change in patient's overall psychiatric and behavioral condition. No report of significant new issues/concerns. No medication changes.    ________________________________________________  HISTORY OF PRESENT ILLNESS      #Interval Change  -No report of significant change in patient's overall psychiatric and behavioral condition since last visit.  -No report of significant new issues/concerns.  -Mother found a roommate for patient. Potentially moving in in a couple months.        #ADHD  Stable. No report of significant issues with ADHD symptomology          #Behavior   Stable. No report of challenging behavior since last visit.  No report of physical aggression, property destruction, elopement, or self-injurious behavior.   Recall from prior: Occasionally grabs at mom's arm for attention. Prior to trying stimulant, had been noted to be hitting himself with his hand or hitting his head on the wall in anger.         #Anxiety/Separation Anxiety  -Baseline reassurance-seeking behavior.   -Improved separation anxiety         #Mood/Irritability   Stable.  -No report of significant changes in mood, crying spells, frequent mood swings, or significant irritability.   -No report of concern for depression from staff/family.  Recall from prior: rarely having meltdowns, maybe 1 to 3 per week. Able to calm him down a lot faster than before. Rarely lasting more 5 minutes instead of previous 15 minutes. No longer having raging anger and lashing out at others. Recall from prior, patient tends to give off a grunting noise ahead of getting angry but grunting does not always lead to anger.           #Medication  Patient was prescribed antibiotics for fever by  PCP.  -Endorses medication adherence.  -No report of new or significant/bothersome medication side effects.   -Mother reports feeling that current meds seem adequate and expresses preference for not making medication changes at this time.       #Health  Saw ophthalmologist for eyes. No significant finding. Was treated for fever by PCP on 2/13/2025.  No report of hospitalizations, ED visits, or changes in non-psych medication since last visit.   Patient does not have a dentist.      ________________________________________________   REVIEW OF SYMPTOMS  -Depression: negative  -Anxiety: negative  -Psychosis: negative  -Nadia: negative  -ADHD: negative  -OCD: negative  -ASD: Deficits in social-emotional reciprocity. Some insistence on sameness. Will rearrange items in the bathroom for example. However, does not get excessively upset if prevented from doing so. Does well with transitions. Does not exhibit inflexible adherence to routines. Stereotyped/repetitive motor movements have resolved in adolescence. Loves doing puzzles, coloring, and playing [employer], but mother reports not feeling that these are obsessions.  -Aggression: does not historically behave aggressively towards others. However, on two occasions in the past two years he has grabbed his sister's arm in anger. See HPI for recent.   -Self-injury: when frustrated, occasionally hits his head on his tube feed machine or Flaviar switch/Gameboy  -Sleep: No issues reported. Getting adequate sleep and appearing well-rested. No changes from baseline. In the past did have a history of sleep issues but this resolved with CBD oil that he is no longer taking   -Appetite: No issues reported. No changes from baseline.   -Medical ROS: patient does not endorse difficulty urinating, constipation, seizures, rash, or polydipsia.          ________________________________________________  PAST PSYCHIATRIC HISTORY  Diagnosed with ASD and ID at around 20 months. Around age  13yo was briefly on Prozac for behavioral issues. At one point patient tried to open car door as mother was driving. Briefly hospitalized in [place]. Hospital assumed that to be a suicide attempt but mother disputes that. Patient was discharged on Zoloft. Soon after they discovered that patient had GI issues. Once those were addressed, behaviors resolved and he stopped the medication. Has not been on medications since then. Has not followed with psychiatry or been hospitalized since then either. In the past has had issues with irregular sleep schedule. Mother reports having used CBD oil for that but has since stopped as sleep has since normalized.       SOCIAL HISTORY:   -Lives with mom and two adult siblings  -Family: significant involvement  -Care needs: 24/7 supervision, requires supervision with ADLs, assistance with iADLS   -Work/School: attendance suspended during COVID pandemic since March  -Guardianship: mother is guardian  -Cognitive abilities: Formal testing as a child, was told he functioned at level of a 7 to 7yo. Cannot read or write. No money management abilities.  -Does not endorse smoking, ETOH, or illicit drug use. No history of past substance abuse.  -No reported history of significant trauma   -No reported access to firearms        PAST MEDICAL HISTORY:   -Asthma  -Rumination syndrome  Around age 13 he started refusing PO intake. Required feeding tube for failure to thrive. In most recent history he has had ulcer. Patient will chew food and take liquids but he will spit it out. Refuses to swallow anything.  -Allergies: shrimp, Omeprazole (rash)  -No reported history of seizures  -No reported history of cardiac issues  -Had basic genetic workup around age 6yo to rule out things like Fragile X   -PCP: Uli Donovan MD  -Ophthalmologist: Dayna Scherer ([facility])     FAMILY HISTORY  -No reported family history of suicide  -Brother with depression, two sisters with  anxiety  -Has a second cousin with ASD  -No reported family history of early sudden cardiac arrest        ____________________________________  Psychotherapy provided   -Provided 20 minutes of psychotherapy to patient and/or family/caregivers    -Psychotherapeutic interventions utilized: Supportive, Parent/Caregiver Training  -Target issues/Main topics discussed: psychiatric symptoms, behavior, daily life, sleep, hobbies/interests, advanced care planning, upcoming changes in living situation, seeking roommate  -Response to therapy: actively participated and responded favorably to the above psychotherapeutic interventions           ____________________________________  Mental Status Exam  Appearance: adequate grooming  Eye Contact: avoidant     Demeanor: guarded  Motor Activity: Average, no PMA/PMR.  Musculoskeletal: No TD, tics, or tremor appreciated. AIMS not assessed this visit due to technological limitations. Last AIMS score 0.   Mood: anxious  Affect: restricted    Speech: limited verbal expression. Prolonged latency, stereotyped/repetitive speech. Use of gestures to make needs/wants known.  Thought Process: [place]. Generally goal directed. Associations are logical in context of patient's developmental level. But assessment limited in setting of intellectual/developmental disability with impaired communication abilities.   Thought Content: Unable to fully assess given patient with intellectual/developmental disability and/or impaired communication abilities.  Thought Perception: Does not appear to be responding to hallucinatory stimuli.   Orientation: alert, oriented to person and general situation  Attention/Concentration: distractable  Cognition: intellectual disability   Insight: impaired, in setting of intellectual/developmental disability  Judgement: impaired, in setting of intellectual/developmental disability          ____________________________________  RISK ASSESSMENT   Patient is currently felt to  be at low acute and imminent risk of harm to self and others. However, chronic risk is elevated given history of self-harming behavior and aggression towards others. He does not currently meet criteria for inpatient psychiatric hospitalization given that he does not exhibit evidence of significant deterioration in baseline psychiatric illness, aggression, self-injury, and ability to care for self. There is no evidence that patient's current caregivers/living environment are unable to safely manage patient's behavior. Overall risk mitigated by continued psychiatric follow-up care, psychopharmacologic intervention, 24/7 supervision, and [facility] services. Patient/caregivers are aware of emergency psychiatric resources available in the event of an acute psychiatric emergency, Mobile Crisis, 911, and local ER.         ____________________________________  IMPRESSION  Male with history of autism spectrum disorder, intellectual disability, anxiety, and ADHD presenting for ongoing management of psychotropic medication.         ###      As of this appointment:  -No report of significant change in patient's overall psychiatric and behavioral condition since last visit.  -Ongoing issues with red eye which is getting addressed by ophthalmologist but no known cause per mum.   -Appears to be tolerating medication regimen without report of significant or bothersome side effects.  -Will continue current medication regimen and plan for follow-up in about 3 to 4 months  ###           Diagnoses:  -ADHD  -Other Mixed Anxiety Disorder  -Separation Anxiety Disorder  -Autism Spectrum Disorder  -Intellectual Disability        PLAN / MANAGEMENT / RECOMMENDATIONS      #Visit Complexity  -Visit of high complexity given patient's intellectual/developmental disability and/or impaired communication abilities resulting in need for the presence of a third party (mother) to provide clinical information and history.      #Medication  Management  -Continue scheduled Clonidine 0.1mg tid (8:00, 12:00, 20:00)        #Medication Considerations  -Medication consent/assent:   Risks, benefits, alternatives, off-label uses, black box warnings, and frequent/important side effects of medications have been discussed with patient/caregiver. To the extent possible, they have voiced understanding and agreement with recommended use of psychotropic medication.     -Medical necessity for continued treatment with psychotropic medication:      [] Symptom reduction        [] Improvement in functioning      [x] Maintenance therapy to reduce risk of harm to self/others      [x] Maintenance therapy to prevent deterioration in functioning      -Rational for not reducing medication dose at this time:           [x] Significant risk of deterioration in functioning       [] Concern for elevating risk of harm to self/others      [x] Prior dose reduction unsuccessful and/or harmful      [] Psychiatric/behavioral condition not adequately stabilized/optimized       [] Medication regimen recently modified          -OARRS  --I have personally reviewed patient's OARRS report.       -Risk/Benefit assessment:  There is no report of signs/symptoms consistent with medication-induced impairment in daily functioning. At this time, benefits of medication felt to outweigh potential risks. But we will continue to reassess need for psychotropic medication at regular 3 to 6 month intervals, or sooner as clinically indicated.          #Medication Monitoring Plan:   -Patient not currently taking medications requiring routine laboratory monitoring         #MDM/Complexity Issues    [] Chronic medical comorbidities     [x] Chronic risk of harm to self/others     [x] Intellectual/Developmental disability     [x] Need for independent historian due to intellectual/developmental disability and/or impaired   communication abilities     [] Controlled substance medication requiring regular monitoring      [] Medication requiring significant ongoing monitoring for toxicity       #Counseling Provided     [x] Diagnostic impression/prognosis      [x] Risks and benefits of treatment options     [x] Instruction for management/treatment and follow-up     [x] Educated patient/caregiver about: behavioral interventions, sleep hygiene, safety planning                  #Coordination of care provided    [x] Family    [] Caregiver/DSP/Staff       []Agency supervisor       [] Nursing staff      [] SSA/          []Therapist                     [] Guardian          #Follow-up      -Follow-up in about 3 to 4 months, or sooner if new/worsening symptoms    >>>Scheduled virtual Follow-up Appt on 6/5/2025 at 13:00.       Time  -Prep time on date of the patient encounter: 10 minutes  -Time spent directly with patient/family/caregiver: 35 minutes  -Additional time spent on patient care activities: 10 minutes  -Documentation time: 10 minutes  -Total time on date of patient encounter: 65 minutes       Scribe Attestation  By signing my name below, IOlivia, Scribe   attest that this documentation has been prepared under the direction and in the presence of Leyla Bell DO.

## 2025-02-27 NOTE — PATIENT INSTRUCTIONS
AFTER VISIT SUMMARY      Date: 2/27/2025  Appointment with Psychiatrist - Dr. Bell      Reason for Visit:  -Routine follow-up/Medication management      Discussed during Appointment:   -Physical health, psychiatric/behavioral symptoms, daily functioning, new issues/concerns     -Treatment plan/management, including medication      Clinical Impression/Status Update:  -No report of significant change in patient's overall psychiatric and behavioral condition since last visit.  -Ongoing issues with red eye which is getting addressed by ophthalmologist but no known cause.   -Current medication regimen appears to be appropriately effective without experiencing significant or bothersome side effects.  --We will continue current medications and see him again in 3 to 4 months.      #Clinic Policies/Procedures  -Refills  Prescriptions will be sent to your pharmacy with enough refills to last until your next appointment. For established patients, we typically provide 6 refills for regular meds and 3 refills for controlled substances (e.g., ADHD stimulant medication, benzodiazepines such as lorazepam). We will not provide additional refills beyond that without having an appointment. You can schedule an appointment by sending a [employer] message or calling our office at [phone].     -Paperwork Requests (e.g., Expert Eval for guardianship)  We ask that you please schedule an appointment if needing paperwork filled out by the doctor (e.g., Expert Eval, FMLA form).  Please provide as much information as possible about your request and email the doctor any forms needing to be filled out prior to the appointment.       ===========================  INSTRUCTIONS/RECOMMENDATIONS  ===========================    #Medication   -No medication changes made today  --Continue taking your psych medications the same as usual    --Refills will be sent to your pharmacy    >>For prior authorization issues at the pharmacy -> Call the office  and ask to talk to Nurse Frost.      If having technical Issues with Epic or [employer]-> Please help us improve the Epic experience  To help identify issues needing to be fixed and improve patient care, please report any issues you experience with Epic or  [employer], such as difficulty connecting to video during virtual visits.  [phone]      #Follow-up  --Your next appointment is scheduled for 6/5/2025 at 1:00pm (virtual visit with [employer])    *If having new/worsening symptoms, please send a [employer] message or call the clinic ([phone]) to discuss being seen sooner   >>If unable to reach the office, send me an email at [email or web address]

## 2025-05-06 DIAGNOSIS — J30.1 ALLERGIC RHINITIS DUE TO POLLEN, UNSPECIFIED SEASONALITY: ICD-10-CM

## 2025-05-06 RX ORDER — FLUTICASONE PROPIONATE 50 MCG
SPRAY, SUSPENSION (ML) NASAL
Qty: 16 G | Refills: 11 | Status: SHIPPED | OUTPATIENT
Start: 2025-05-06

## 2025-06-03 NOTE — PROGRESS NOTES
Others Attending Appointment:  -Mother  *Presence necessary as independent historian given patient's intellectual/developmental disability and/or impaired communication abilities        LAST INTERVENTION  Last seen about 4 months ago in February 2025. No report of significant change in patient's overall psychiatric and behavioral condition. No report of significant new issues/concerns. No medication changes.      ________________________________________________  HISTORY OF PRESENT ILLNESS      #Interval Change  -New report of behavioral concerns during morning feeding tube change in past few weeks.   -Previously reported red eye has resolved.      #ADHD  Stable.   No report of significant issues with ADHD symptomology          #Behavior   Worse.   New report of challenging behavior in the past 3 weeks (week of May 12).   Mother reports agitation and less cooperative in the morning when getting his feeding tube changed. Mother reports that behavior is not specific to anyone. No behavioral issues outside of that setting.   No report of physical aggression, property destruction, elopement, or self-injurious behavior.   Recall from prior: Occasionally grabs at mom's arm for attention. Prior to trying stimulant, had been noted to be hitting himself with his hand or hitting his head on the wall in anger.         #Anxiety/Separation Anxiety  -Baseline reassurance-seeking behavior.   -Improved separation anxiety         #Mood/Irritability   Stable.  -No report of significant changes in mood, crying spells, frequent mood swings, or significant irritability.           #Medication  -Endorses medication adherence.  -No report of new or significant/bothersome medication side effects.          #Health  Stable. Has upcoming GI appointment.  No report of hospitalizations, ED visits, or changes in non-psych medication since last visit.   Patient does not have a dentist.   Recall from prior: Saw ophthalmologist for eyes. No  significant finding. Was treated for fever by PCP on 2/13/2025.     ________________________________________________   REVIEW OF SYMPTOMS  -Depression: negative  -Anxiety: negative  -Psychosis: negative  -Nadia: negative  -ADHD: negative  -OCD: negative  -ASD: Deficits in social-emotional reciprocity. Some insistence on sameness. Will rearrange items in the bathroom for example. However, does not get excessively upset if prevented from doing so. Does well with transitions. Does not exhibit inflexible adherence to routines. Stereotyped/repetitive motor movements have resolved in adolescence. Loves doing puzzles, coloring, and playing Skillset, but mother reports not feeling that these are obsessions.  -Aggression: does not historically behave aggressively towards others. However, on two occasions in the past two years he has grabbed his sister's arm in anger. See HPI for recent.   -Self-injury: when frustrated, occasionally hits his head on his tube feed machine or [employer] switch/Gameboy  -Sleep: No issues reported. Getting adequate sleep and appearing well-rested. No changes from baseline. In the past did have a history of sleep issues but this resolved with CBD oil that he is no longer taking   -Appetite: No issues reported. No changes from baseline.   -Medical ROS: patient does not endorse difficulty urinating, constipation, seizures, rash, or polydipsia.          ________________________________________________  PAST PSYCHIATRIC HISTORY  Diagnosed with ASD and ID at around 20 months. Around age 13yo was briefly on Prozac for behavioral issues. At one point patient tried to open car door as mother was driving. Briefly hospitalized in [place]. Hospital assumed that to be a suicide attempt but mother disputes that. Patient was discharged on Zoloft. Soon after they discovered that patient had GI issues. Once those were addressed, behaviors resolved and he stopped the medication. Has not been on medications  since then. Has not followed with psychiatry or been hospitalized since then either. In the past has had issues with irregular sleep schedule. Mother reports having used CBD oil for that but has since stopped as sleep has since normalized.       SOCIAL HISTORY:   -Lives with mom and two adult siblings  -Family: significant involvement  -Care needs: 24/7 supervision, requires supervision with ADLs, assistance with iADLS   -Work/School: attendance suspended during COVID pandemic since March  -Guardianship: mother is guardian  -Cognitive abilities: Formal testing as a child, was told he functioned at level of a 7 to 7yo. Cannot read or write. No money management abilities.  -Does not endorse smoking, ETOH, or illicit drug use. No history of past substance abuse.  -No reported history of significant trauma   -No reported access to firearms        PAST MEDICAL HISTORY:   -Asthma  -Rumination syndrome  Around age 13 he started refusing PO intake. Required feeding tube for failure to thrive. In most recent history he has had ulcer. Patient will chew food and take liquids but he will spit it out. Refuses to swallow anything.  -Allergies: shrimp, Omeprazole (rash)  -No reported history of seizures  -No reported history of cardiac issues  -Had basic genetic workup around age 4yo to rule out things like Fragile X   -PCP: Uli Donovan MD  -Ophthalmologist: Dayna Scherer ([facility])     FAMILY HISTORY  -No reported family history of suicide  -Brother with depression, two sisters with anxiety  -Has a second cousin with ASD  -No reported family history of early sudden cardiac arrest        ____________________________________  Psychotherapy provided   -Provided 20 minutes of psychotherapy to patient and/or family/caregivers    -Psychotherapeutic interventions utilized: Supportive, Parent/Caregiver Training  -Target issues/Main topics discussed: psychiatric symptoms, behavior, daily life, sleep,  hobbies/interests, advanced care planning, upcoming changes in living situation, seeking roommate  -Response to therapy: actively participated and responded favorably to the above psychotherapeutic interventions           ____________________________________  Mental Status Exam  Appearance: adequate grooming  Eye Contact: avoidant     Demeanor: guarded  Motor Activity: Average, no PMA/PMR.  Musculoskeletal: No TD, tics, or tremor appreciated. AIMS not assessed this visit due to technological limitations. Last AIMS score 0.   Mood: anxious  Affect: restricted    Speech: limited verbal expression. Prolonged latency, stereotyped/repetitive speech. Use of gestures to make needs/wants known.  Thought Process: [place]. Generally goal directed. Associations are logical in context of patient's developmental level. But assessment limited in setting of intellectual/developmental disability with impaired communication abilities.   Thought Content: Unable to fully assess given patient with intellectual/developmental disability and/or impaired communication abilities.  Thought Perception: Does not appear to be responding to hallucinatory stimuli.   Orientation: alert, oriented to person and general situation  Attention/Concentration: distractable  Cognition: intellectual disability   Insight: impaired, in setting of intellectual/developmental disability  Judgement: impaired, in setting of intellectual/developmental disability          ____________________________________  RISK ASSESSMENT   Patient is currently felt to be at low acute and imminent risk of harm to self and others. However, chronic risk is elevated given history of self-harming behavior and aggression towards others. He does not currently meet criteria for inpatient psychiatric hospitalization given that he does not exhibit evidence of significant deterioration in baseline psychiatric illness, aggression, self-injury, and ability to care for self. There is no  evidence that patient's current caregivers/living environment are unable to safely manage patient's behavior. Overall risk mitigated by continued psychiatric follow-up care, psychopharmacologic intervention, 24/7 supervision, and [facility] services. Patient/caregivers are aware of emergency psychiatric resources available in the event of an acute psychiatric emergency, Mobile Crisis, 911, and local ER.         ____________________________________  IMPRESSION  Male with history of autism spectrum disorder, intellectual disability, anxiety, and ADHD presenting for ongoing management of psychotropic medication.         ###      As of this appointment:  -Overall psychiatric and behavioral condition appears worse since last visit  -New report of challenging behavior  the morning when getting his feeding tube changed over the past 3 weeks, --described as seeming more agitated and less cooperative. No behavioral issues outside of that setting. Does not seem specific to certain caregivers. No history of prior.   -Given behavior specific to one setting and patient limited in his ability to communicate, would like to rule out discomfort or other health issues related to tube. Will reassess after evaluated by GI.  -Has upcoming GI appt per mum. Will plan to prescribe valium if he needs it for any procedures.   -In the future, if medical issues are rules out and still having issues in the morning, may try prn options.  -Will continue current medication regimen for now and plan for follow-up in about 4 weeks following GI appointment.  ###        Diagnoses:  -ADHD  -Other Mixed Anxiety Disorder  -Separation Anxiety Disorder  -Autism Spectrum Disorder  -Intellectual Disability        PLAN / MANAGEMENT / RECOMMENDATIONS                           #Visit Complexity  -Visit of high complexity given patient's intellectual/developmental disability and/or impaired communication abilities resulting in need for the presence of a third  party (mother) to provide clinical information and history.      #Medication Management  -Continue scheduled Clonidine 0.1mg tid (8:00, 12:00, 20:00)        #Medication Considerations  -Medication consent/assent:   Risks, benefits, alternatives, off-label uses, black box warnings, and frequent/important side effects of medications have been discussed with patient/caregiver. To the extent possible, they have voiced understanding and agreement with recommended use of psychotropic medication.     -Medical necessity for continued treatment with psychotropic medication:      [] Symptom reduction        [] Improvement in functioning      [x] Maintenance therapy to reduce risk of harm to self/others      [x] Maintenance therapy to prevent deterioration in functioning      -Rational for not reducing medication dose at this time:           [x] Significant risk of deterioration in functioning       [] Concern for elevating risk of harm to self/others      [x] Prior dose reduction unsuccessful and/or harmful      [] Psychiatric/behavioral condition not adequately stabilized/optimized       [] Medication regimen recently modified          -OARRS  --I have personally reviewed patient's OARRS report.       -Risk/Benefit assessment:  There is no report of signs/symptoms consistent with medication-induced impairment in daily functioning. At this time, benefits of medication felt to outweigh potential risks. But we will continue to reassess need for psychotropic medication at regular 3 to 6 month intervals, or sooner as clinically indicated.          #Medication Monitoring Plan:   -Patient not currently taking medications requiring routine laboratory monitoring         #MDM/Complexity Issues    [] Chronic medical comorbidities     [x] Chronic risk of harm to self/others     [x] Intellectual/Developmental disability     [x] Need for independent historian due to intellectual/developmental disability and/or impaired   communication  abilities     [] Controlled substance medication requiring regular monitoring     [] Medication requiring significant ongoing monitoring for toxicity       #Counseling Provided     [x] Diagnostic impression/prognosis      [x] Risks and benefits of treatment options     [x] Instruction for management/treatment and follow-up     [x] Educated patient/caregiver about: behavioral interventions, sleep hygiene, safety planning                  #Coordination of care provided    [x] Family    [] Caregiver/DSP/Staff       []Agency supervisor       [] Nursing staff      [] SSA/          []Therapist                     [] Guardian          #Follow-up      -Follow-up in about 1 month, or sooner if new/worsening symptoms    >>>Scheduled virtual Follow-up Appt on 7/3/2025 at 15:00.       Time  -Prep time on date of the patient encounter: 10 minutes  -Time spent directly with patient/family/caregiver: 35 minutes  -Additional time spent on patient care activities: 10 minutes  -Documentation time: 10 minutes  -Total time on date of patient encounter: 65 minutes       Scribe Attestation  By signing my name below, IOlivia, Scribe   attest that this documentation has been prepared under the direction and in the presence of Leyla Bell DO.

## 2025-06-05 ENCOUNTER — APPOINTMENT (OUTPATIENT)
Dept: BEHAVIORAL HEALTH | Facility: CLINIC | Age: 30
End: 2025-06-05
Payer: MEDICAID

## 2025-06-05 DIAGNOSIS — F90.2 ATTENTION DEFICIT HYPERACTIVITY DISORDER (ADHD), COMBINED TYPE: ICD-10-CM

## 2025-06-05 DIAGNOSIS — F41.3 OTHER MIXED ANXIETY DISORDERS: ICD-10-CM

## 2025-06-05 DIAGNOSIS — Z09 PSYCHIATRIC FOLLOW-UP: ICD-10-CM

## 2025-06-05 DIAGNOSIS — F93.0 SEPARATION ANXIETY: ICD-10-CM

## 2025-06-05 DIAGNOSIS — Z86.59 PSYCHIATRIC FOLLOW-UP: ICD-10-CM

## 2025-06-05 DIAGNOSIS — F84.0 AUTISM SPECTRUM DISORDER (HHS-HCC): ICD-10-CM

## 2025-06-05 DIAGNOSIS — F79 INTELLECTUAL DISABILITY: ICD-10-CM

## 2025-06-05 PROCEDURE — 99215 OFFICE O/P EST HI 40 MIN: CPT | Performed by: STUDENT IN AN ORGANIZED HEALTH CARE EDUCATION/TRAINING PROGRAM

## 2025-06-05 NOTE — PATIENT INSTRUCTIONS
AFTER VISIT SUMMARY      Date: 6/5/2025  Appointment with Psychiatrist - Dr. Bell      Reason for Visit:  -Routine follow-up/Medication management      Discussed during Appointment:   -Physical health, psychiatric/behavioral symptoms, daily functioning, new issues/concerns     -Treatment plan/management, including medication      Clinical Impression/Status Update:  -Behavior is exclusive to feeding tube change in the morning. Could be situational or environmental settings contributing to this.   -Recommended mom pays attention to these conditions.  -Patient has upcoming GI appointment. Recommended ruling medical issues.  -Will prescribe valium if he needs it for any procedures.  --We will continue current medications and see him again in 1 month.      #Clinic Policies/Procedures  -Refills  Prescriptions will be sent to your pharmacy with enough refills to last until your next appointment. For established patients, we typically provide 6 refills for regular meds and 3 refills for controlled substances (e.g., ADHD stimulant medication, benzodiazepines such as lorazepam). We will not provide additional refills beyond that without having an appointment. You can schedule an appointment by sending a [employer] message or calling our office at [phone].     -Paperwork Requests (e.g., Expert Eval for guardianship)  We ask that you please schedule an appointment if needing paperwork filled out by the doctor (e.g., Expert Eval, FMLA form).  Please provide as much information as possible about your request and email the doctor any forms needing to be filled out prior to the appointment.       ===========================  INSTRUCTIONS/RECOMMENDATIONS  ===========================    #Medication   -Medication changes made today  --We are starting you on valium 5mg as needed for pre-procedure sedation    >>For prior authorization issues at the pharmacy -> Call the office and ask to talk to Nurse Frost.      If having  technical Issues with Epic or [employer]-> Please help us improve the Epic experience  To help identify issues needing to be fixed and improve patient care, please report any issues you experience with Epic or  [employer], such as difficulty connecting to video during virtual visits.  [phone]      #Follow-up  --Your next appointment is scheduled for 7/3/2025 at 3:00pm (virtual visit with [facility])    *If having new/worsening symptoms, please send a [employer] message or call the clinic ([phone]) to discuss being seen sooner   >>If unable to reach the office, send me an email at [email or web address]

## 2025-06-11 RX ORDER — CLONIDINE HYDROCHLORIDE 0.1 MG/1
TABLET ORAL
Qty: 270 TABLET | Refills: 1 | Status: SHIPPED | OUTPATIENT
Start: 2025-06-11

## 2025-06-16 ENCOUNTER — APPOINTMENT (OUTPATIENT)
Dept: GASTROENTEROLOGY | Facility: CLINIC | Age: 30
End: 2025-06-16
Payer: MEDICAID

## 2025-06-16 VITALS — OXYGEN SATURATION: 89 % | HEIGHT: 68 IN | BODY MASS INDEX: 23.19 KG/M2 | WEIGHT: 153 LBS | HEART RATE: 74 BPM

## 2025-06-16 DIAGNOSIS — K20.0 EOSINOPHILIC ESOPHAGITIS: ICD-10-CM

## 2025-06-16 DIAGNOSIS — F84.0 AUTISM SPECTRUM DISORDER (HHS-HCC): Primary | ICD-10-CM

## 2025-06-16 PROCEDURE — 99214 OFFICE O/P EST MOD 30 MIN: CPT | Performed by: INTERNAL MEDICINE

## 2025-06-16 PROCEDURE — 1036F TOBACCO NON-USER: CPT | Performed by: INTERNAL MEDICINE

## 2025-06-16 PROCEDURE — 3008F BODY MASS INDEX DOCD: CPT | Performed by: INTERNAL MEDICINE

## 2025-06-16 ASSESSMENT — ENCOUNTER SYMPTOMS: SHORTNESS OF BREATH: 0

## 2025-06-16 NOTE — PROGRESS NOTES
REASON FOR VISIT:  EoE  PCP (requesting provider): Uli Dawkins MD.    HPI:  Norma Salinas is a 29 y.o. male with a past medical history of autism and developmental delay, anorexia and malnutrition s/p PEG placement following for PEG tube status and EoE. Patient with longstanding Joni-Key button PEG that is managed by his mother. EoE stable on Esomeprazole. The patient unfortunately has developmental delay with behavioral issues and despite multiple interventions with medications and also with trial of abdominal binder, he was removing his PEG on a nightly basis. This was causing significant trauma to the site and so his mother is actually removing the PEG at nighttime and sterilizing it and replacing it in the morning. She uses approximately 5 Joni-Keys a month through this process. This is not optimal but at this point it seems likely the best option available as he also does not tolerate an abdominal  binder.    The patient and mother report that patient has had some recent morning agitation and behavioral troubles in the morning. They are replacing the PEG prior to bed and replacing in the morning. Mother reports patient does ok with IV and sedation in the past. He was changed to a new tube feed regimen by Nutrition and on SalesWarp 1.5 which is protein instead of whey based. They do five feeds so essentially 2-3 hours while awake. His mother stopped Esomeprazole and transitioned to Gastricell which is a digestive enzyme. Mother reports the feeds he is on do not contain any foods within the 6 food elimination diet. Patient does not take anything orally. He has gained 11 lbs over time.     PSurgHx: None     FamHx: No GI cancer      Prior Endoscopy:  -EGD (12/2019): Erythematous mucosa and erosion in distal esophagus (esophageal biopsies with mild EoE up to 14 eos / hpf), normal stomach (normal gastric biopsies), normal duodenum (normal duodenal biopsies).  -EGD (12/2017): Scattered aphthous ulcers and  esophagitis in distal esophagus and GEJ (mid and distal esophageal biopsies with EoE up to 19-33 eos / hpf), gastric erythema (gastric biopsies with mild eosinophilia), scattered congested and erythematous mucosa in duodenal bulb (duodenal biopsies with mild duodenitis).  -EGD (8/2017): Esophagitis and furrowing in mid and distal esophagus (esophageal biopsies with EoE), gastropathy (gastric biopsies with slightly prominent eosinophilia), duodenitis (duodenal biopsies with mild patchy peptic duodenitis).  -EGD (4/2016): Prominent folds with mild erythema in the distal esophagus (normal proximal and distal esophageal biopsies), normal stomach (normal gastric biopsies), normal duodenum (normal duodenal biopsies), Joni-Key button PEG in place.  -EGD (7/2014): Mid and distal esophagus with linear mucosal furrowing with decreased vascular markings (mid and distal esophageal biopsies with severe EoE up to 150 eos / hpf), prominent thickened mucosal folds at GEJ, normal stomach (normal gastric biopsies), normal duodenum (normal duodenal biopsies), Joni-Key button PEG in place.  -EGD (3/2014): Extensive mucosal furrowing in mid and distal esophagus (esophageal biopsies showed severe EoE with 150-200 eos / hpf).  -EGD (1/2013): Linear furrowing and ringed appearance of the entire esophagus (esophageal biopsies with EoE with up to 60-90 eos / hpf), PEG tract dilated with placement of 14 Fr and 2.5 cm Joni-Key button PEG, normal duodenum.  -EGD (5/2012): No procedure report available (proximal and distal esophageal biopsies normal).  -EGD (7/2007): Normal esophagus, placement of 14 Fr and 2 .5 cm Joni-Key PEG, normal duodenum.  -EGD (3/2007): No procedure report available (path showed esophageal biopsies with reflux changes and normal gastric biopsies).     PAST MEDICAL HISTORY  Medical History[1]    PAST SURGICAL HISTORY  Surgical History[2]    FAMILY HISTORY  Family History[3]    SOCIAL HISTORY   reports that he has never  smoked. He has never used smokeless tobacco. He reports that he does not drink alcohol and does not use drugs.    REVIEW OF SYSTEMS  Review of Systems   Respiratory:  Negative for shortness of breath.    Cardiovascular:  Negative for chest pain.   All other systems reviewed and are negative.    A 10+ point review of systems was otherwise negative except as noted and per HPI.    ALLERGIES  Allergies[4]    MEDICATIONS  Current Outpatient Medications   Medication Instructions    ascorbic acid/vitamin E (VITAMIN C-VITAMIN E ORAL) 1 each, Daily    B.animalis,bifid,infantis,long (PROBIOTIC 4X ORAL) 1 each, Daily    cholecalciferol (VITAMIN D-3) 125 mcg, Daily    cloNIDine (Catapres) 0.1 mg tablet Take SCHEDULED three times per day via G-tube: one tablet (0.1mg) in morning (8:00am), one tablet (0.1mg) in midday (2:00pm), and one tablet (0.1mg) in evening (8:00pm) for anxiety/ADHD [F41.3/F90.2]    esomeprazole (NEXIUM PACKET) 20 mg, oral, Daily, LAST REFILL TILL Appt IS MADE    fluticasone (Flonase) 50 mcg/actuation nasal spray USE 1 SPRAY IN EACH NOSTRIL ONCE DAILY, SHAKE GENTLY, BEFORE FIRST USE PRIME PUMP, AFTER USE CLEAN TIP AND REPLACE*CAP    nutritional supplement/fiber (PEPTIDE FORMULA 1.5 ORAL) 1 Container, 5 times daily       VITALS  Vitals:    06/16/25 1312   Pulse: 74   SpO2: (!) 89%      Body mass index is 23.26 kg/m².    PHYSICAL EXAM  CONSTITUTIONAL: NAD, appears stated age  EYES: anicteric sclera, sclera clear  HEAD: normocephalic, atraumatic   NECK: supple   PULMONARY: CTAB  CARDIOVASCULAR: RRR, no M/R/G appreciated   ABDOMEN: soft, NTND, +BS, no rebound or guarding   MUSCULOSKELETAL: no edema  SKIN: no jaundice   PSYCHIATRIC: autism, developmental delay    LABS  WBC   Date Value   01/23/2025 4.4 x10*3/uL   01/23/2024 5.4 x10*3/uL   09/01/2022 4.8 x10E9/L   02/11/2020 4.5 x10E9/L     Hemoglobin (g/dL)   Date Value   01/23/2025 15.4   01/23/2024 16.0   09/01/2022 15.7   02/11/2020 16.2     Platelets   Date  Value   01/23/2025 395 x10*3/uL   01/23/2024 462 x10*3/uL (H)   09/01/2022 404 x10E9/L   02/11/2020 348 x10E9/L     Sodium (mmol/L)   Date Value   01/23/2025 137   01/23/2024 138   09/01/2022 138   02/11/2020 140     Potassium (mmol/L)   Date Value   01/23/2025 4.4   01/23/2024 4.6   09/01/2022 4.6   02/11/2020 4.2     Chloride (mmol/L)   Date Value   01/23/2025 100   01/23/2024 99   09/01/2022 102   02/11/2020 102     Bicarbonate (mmol/L)   Date Value   01/23/2025 30   01/23/2024 29   09/01/2022 30   02/11/2020 29     Urea Nitrogen (mg/dL)   Date Value   01/23/2025 17   01/23/2024 17   09/01/2022 16   02/11/2020 22     Creatinine (mg/dL)   Date Value   01/23/2025 0.69   01/23/2024 0.64   09/01/2022 0.66   02/11/2020 0.67     Calcium (mg/dL)   Date Value   01/23/2025 10.5 (H)   01/23/2024 10.2   09/01/2022 10.3   02/11/2020 10.1     Total Protein (g/dL)   Date Value   01/23/2025 8.1   01/23/2024 7.9   09/01/2022 7.8   02/11/2020 8.2     Bilirubin, Total (mg/dL)   Date Value   01/23/2025 0.3   01/23/2024 0.3     Total Bilirubin (mg/dL)   Date Value   09/01/2022 0.3   02/11/2020 0.3     Alkaline Phosphatase (U/L)   Date Value   01/23/2025 74   01/23/2024 75   09/01/2022 78   02/11/2020 91     ALT (U/L)   Date Value   01/23/2025 19   01/23/2024 28     ALT (SGPT) (U/L)   Date Value   09/01/2022 27   02/11/2020 22     AST (U/L)   Date Value   01/23/2025 23   01/23/2024 27   09/01/2022 25   02/11/2020 36     Glucose (mg/dL)   Date Value   01/23/2025 80   01/23/2024 78   09/01/2022 81   02/11/2020 82     C-Reactive Protein (mg/dL)   Date Value   01/23/2025 <0.10       ASSESSMENT/PLAN  Norma Salinas is a 29 y.o. male with a past medical history of autism and developmental delay, anorexia and malnutrition s/p PEG placement following for PEG tube status and EoE. Patient with longstanding Joni-Key button PEG that is managed by his mother. EoE stable on Esomeprazole. The patient unfortunately has developmental delay with  behavioral issues and despite multiple interventions with medications and also with trial of abdominal binder, he was removing his PEG on a nightly basis. This was causing significant trauma to the site and so his mother is actually removing the PEG at nighttime and sterilizing it and replacing it in the morning. This is not optimal but at this point it seems likely the best option available as he also does not tolerate an abdominal  binder. He has had some increased agitation regarding the PEG in recent months and although this may just be behavioral, it has been many years since last EGD so will plan to update. Differential includes uncontrolled EoE, esophagitis, PUD, and gastritis. He has been weaned off PPI as he does not take anything PO and his tube feed regimen is free of any of the six foods in the elimination diet.    -Plan for EGD including EoE biopsies and his mother will be present to provide consent   -Continue management of PEG tube as noted above  -Tube feed regimen as recommended per Nutrition   -Ok to remain off PPI given no ongoing exposure to trigger foods for EoE    Follow-up will be at the time of the EoE.    Signature: Mark Nieto MD       [1]   Past Medical History:  Diagnosis Date    Personal history of other diseases of the respiratory system 04/25/2018    History of sore throat    Personal history of other diseases of the respiratory system     History of asthma    Personal history of other mental and behavioral disorders     History of autism   [2]   Past Surgical History:  Procedure Laterality Date    OTHER SURGICAL HISTORY  02/11/2020    Esophagogastroduodenoscopy    OTHER SURGICAL HISTORY  02/11/2020    Percutaneous endoscopic jejunostomy tube insertion   [3] No family history on file.  [4]   Allergies  Allergen Reactions    Omeprazole Hives, Itching, Nausea And Vomiting, Nausea Only and Nausea/vomiting    Other Other     Per testing    Shrimp Unknown

## 2025-06-20 ENCOUNTER — OFFICE VISIT (OUTPATIENT)
Dept: GASTROENTEROLOGY | Facility: EXTERNAL LOCATION | Age: 30
End: 2025-06-20
Payer: MEDICAID

## 2025-06-20 DIAGNOSIS — Z93.1 GASTROSTOMY STATUS (MULTI): ICD-10-CM

## 2025-06-20 DIAGNOSIS — K31.89 OTHER DISEASES OF STOMACH AND DUODENUM: ICD-10-CM

## 2025-06-20 DIAGNOSIS — K22.89 OTHER SPECIFIED DISEASE OF ESOPHAGUS: ICD-10-CM

## 2025-06-20 DIAGNOSIS — R10.84 GENERALIZED ABDOMINAL PAIN: Primary | ICD-10-CM

## 2025-06-20 DIAGNOSIS — K20.0 EOSINOPHILIC ESOPHAGITIS: ICD-10-CM

## 2025-06-20 PROCEDURE — 88305 TISSUE EXAM BY PATHOLOGIST: CPT

## 2025-06-20 PROCEDURE — 88305 TISSUE EXAM BY PATHOLOGIST: CPT | Performed by: STUDENT IN AN ORGANIZED HEALTH CARE EDUCATION/TRAINING PROGRAM

## 2025-06-20 PROCEDURE — 43239 EGD BIOPSY SINGLE/MULTIPLE: CPT | Performed by: INTERNAL MEDICINE

## 2025-06-21 ENCOUNTER — LAB REQUISITION (OUTPATIENT)
Dept: LAB | Facility: HOSPITAL | Age: 30
End: 2025-06-21
Payer: MEDICAID

## 2025-06-21 DIAGNOSIS — K20.0 EOSINOPHILIC ESOPHAGITIS: ICD-10-CM

## 2025-06-27 LAB
LABORATORY COMMENT REPORT: NORMAL
PATH REPORT.FINAL DX SPEC: NORMAL
PATH REPORT.GROSS SPEC: NORMAL
PATH REPORT.RELEVANT HX SPEC: NORMAL
PATH REPORT.TOTAL CANCER: NORMAL

## 2025-06-30 DIAGNOSIS — R13.10 DYSPHAGIA, UNSPECIFIED TYPE: ICD-10-CM

## 2025-06-30 RX ORDER — ESOMEPRAZOLE MAGNESIUM 40 MG/1
40 GRANULE, DELAYED RELEASE ORAL DAILY
Qty: 30 EACH | Refills: 11 | Status: SHIPPED | OUTPATIENT
Start: 2025-06-30

## 2025-06-30 NOTE — PROGRESS NOTES
Others Attending Appointment:  -Mother  *Presence necessary as independent historian given patient's intellectual/developmental disability and/or impaired communication abilities        LAST INTERVENTION  Last seen about 4 weeks ago in June 2025. New report of challenging behavior in the morning when getting his feeding tube changed. No medication changes.      ________________________________________________  HISTORY OF PRESENT ILLNESS      #Interval Change  -Previously reported new challenging behavior in the morning when getting feeding tube changed has resolved. Possibly resolved with staffing/agency changes. Patient described as having returned to his psychiatric/behavioral baseline.  -No report of significant new issues/concerns.  -Had EGD done recently. No significant issues noted per mum.       #ADHD  Stable.   No report of significant issues with ADHD symptomology       #Behavior   Stable  Issues reported last visit now resolved.   No report of physical aggression, property destruction, elopement, or self-injurious behavior.        #Anxiety/Separation Anxiety  Stable  Recall from prior: Baseline reassurance-seeking behavior.          #Mood/Irritability   Stable.  -No report of significant changes in mood, crying spells, frequent mood swings, or significant irritability.      #Medication  -Endorses medication adherence.  -No report of new or significant/bothersome medication side effects.         #Health  - Stable  EGD on 06/24/25 : Mucosal changes consistent with eosinophilic esophagitis. Biopsied.                                 Intact gastrotomy with patent Gastric Tube                                 Mild gastric erythema                                 Normal Duodenum       ________________________________________________   REVIEW OF SYMPTOMS  -Depression: negative  -Anxiety: negative  -Psychosis: negative  -Nadia: negative  -ADHD: negative  -OCD: negative  -ASD: Deficits in social-emotional  reciprocity. Some insistence on sameness. Will rearrange items in the bathroom for example. However, does not get excessively upset if prevented from doing so. Does well with transitions. Does not exhibit inflexible adherence to routines. Stereotyped/repetitive motor movements have resolved in adolescence. Loves doing puzzles, coloring, and playing [employer], but mother reports not feeling that these are obsessions.  -Aggression: does not historically behave aggressively towards others. However, on two occasions in the past two years he has grabbed his sister's arm in anger. See HPI for recent.   -Self-injury: when frustrated, occasionally hits his head on his tube feed machine or Nintendo switch/Gameboy  -Sleep: No issues reported. Getting adequate sleep and appearing well-rested. No changes from baseline. In the past did have a history of sleep issues but this resolved with CBD oil that he is no longer taking   -Appetite: No issues reported. No changes from baseline.   -Medical ROS: patient does not endorse difficulty urinating, constipation, seizures, rash, or polydipsia.          ________________________________________________  PAST PSYCHIATRIC HISTORY  Diagnosed with ASD and ID at around 20 months. Around age 13yo was briefly on Prozac for behavioral issues. At one point patient tried to open car door as mother was driving. Briefly hospitalized in [place]. Hospital assumed that to be a suicide attempt but mother disputes that. Patient was discharged on Zoloft. Soon after they discovered that patient had GI issues. Once those were addressed, behaviors resolved and he stopped the medication. Has not been on medications since then. Has not followed with psychiatry or been hospitalized since then either. In the past has had issues with irregular sleep schedule. Mother reports having used CBD oil for that but has since stopped as sleep has since normalized.       SOCIAL HISTORY:   -Lives with mom and two  adult siblings  -Family: significant involvement  -Care needs: 24/7 supervision, requires supervision with ADLs, assistance with iADLS   -Work/School: attendance suspended during COVID pandemic since March  -Guardianship: mother is guardian  -Cognitive abilities: Formal testing as a child, was told he functioned at level of a 7 to 7yo. Cannot read or write. No money management abilities.  -Does not endorse smoking, ETOH, or illicit drug use. No history of past substance abuse.  -No reported history of significant trauma   -No reported access to firearms        PAST MEDICAL HISTORY:   -Asthma  -Rumination syndrome  Around age 13 he started refusing PO intake. Required feeding tube for failure to thrive. In most recent history he has had ulcer. Patient will chew food and take liquids but he will spit it out. Refuses to swallow anything.  -Allergies: shrimp, Omeprazole (rash)  -No reported history of seizures  -No reported history of cardiac issues  -Had basic genetic workup around age 6yo to rule out things like Fragile X   -PCP: Uli Donovan MD  -Ophthalmologist: Dayna Scherer ([facility])     FAMILY HISTORY  -No reported family history of suicide  -Brother with depression, two sisters with anxiety  -Has a second cousin with ASD  -No reported family history of early sudden cardiac arrest        ____________________________________  Psychotherapy provided   -Provided 20 minutes of psychotherapy to patient and/or family/caregivers    -Psychotherapeutic interventions utilized: Supportive, Parent/Caregiver Training  -Target issues/Main topics discussed: psychiatric symptoms, behavior, daily life, sleep, hobbies/interests, advanced care planning, upcoming changes in living situation, seeking roommate  -Response to therapy: actively participated and responded favorably to the above psychotherapeutic interventions           ____________________________________  Mental Status Exam  Appearance: adequate  grooming  Eye Contact: avoidant     Demeanor: guarded  Motor Activity: Average, no PMA/PMR.  Musculoskeletal: No TD, tics, or tremor appreciated. AIMS not assessed this visit due to technological limitations. Last AIMS score 0.   Mood: anxious  Affect: restricted    Speech: limited verbal expression. Prolonged latency, stereotyped/repetitive speech. Use of gestures to make needs/wants known.  Thought Process: [place]. Generally goal directed. Associations are logical in context of patient's developmental level. But assessment limited in setting of intellectual/developmental disability with impaired communication abilities.   Thought Content: Unable to fully assess given patient with intellectual/developmental disability and/or impaired communication abilities.  Thought Perception: Does not appear to be responding to hallucinatory stimuli.   Orientation: alert, oriented to person and general situation  Attention/Concentration: distractable  Cognition: intellectual disability   Insight: impaired, in setting of intellectual/developmental disability  Judgement: impaired, in setting of intellectual/developmental disability          ____________________________________  RISK ASSESSMENT   Patient is currently felt to be at low acute and imminent risk of harm to self and others. However, chronic risk is elevated given history of self-harming behavior and aggression towards others. He does not currently meet criteria for inpatient psychiatric hospitalization given that he does not exhibit evidence of significant deterioration in baseline psychiatric illness, aggression, self-injury, and ability to care for self. There is no evidence that patient's current caregivers/living environment are unable to safely manage patient's behavior. Overall risk mitigated by continued psychiatric follow-up care, psychopharmacologic intervention, 24/7 supervision, and [facility] services. Patient/caregivers are aware of emergency  psychiatric resources available in the event of an acute psychiatric emergency, Mobile Crisis, 911, and local ER.         ____________________________________  IMPRESSION  Male with history of autism spectrum disorder, intellectual disability, anxiety, and ADHD presenting for ongoing management of psychotropic medication.       ###      As of this appointment:  -Overall psychiatric and behavioral condition appears to have improved since last visit.   --Previously reported new challenging behavior in the morning when getting feeding tube changed has resolved. Possibly resolved with staffing/agency changes. Patient described as having returned to his psychiatric/behavioral baseline.  -No report of significant new issues/concerns.  -Had EGD done recently. No significant issues noted per mum.   -Appears to be tolerating medication regimen without report of significant new or bothersome side effects.   -Will continue current medication regimen and plan for follow-up in about 3 months.  ###        Diagnoses:  -ADHD  -Other Mixed Anxiety Disorder  -Separation Anxiety Disorder  -Autism Spectrum Disorder  -Intellectual Disability            PLAN / MANAGEMENT / RECOMMENDATIONS                           #Visit Complexity  -Visit of high complexity given patient's intellectual/developmental disability and/or impaired communication abilities resulting in need for the presence of a third party (mother) to provide clinical information and history.      #Medication Management  -Continue scheduled Clonidine 0.1mg tid (8:00, 12:00, 20:00)        #Medication Considerations  -Medication consent/assent:   Risks, benefits, alternatives, off-label uses, black box warnings, and frequent/important side effects of medications have been discussed with patient/caregiver. To the extent possible, they have voiced understanding and agreement with recommended use of psychotropic medication.     -Medical necessity for continued treatment with  psychotropic medication:      [] Symptom reduction        [] Improvement in functioning      [x] Maintenance therapy to reduce risk of harm to self/others      [x] Maintenance therapy to prevent deterioration in functioning      -Rational for not reducing medication dose at this time:           [] Significant risk of deterioration in functioning       [] Concern for elevating risk of harm to self/others      [x] Prior dose reduction unsuccessful and/or harmful      [] Psychiatric/behavioral condition not adequately stabilized/optimized       [] Medication regimen recently modified          -OARRS  --I have personally reviewed patient's OARRS report.       -Risk/Benefit assessment:  There is no report of signs/symptoms consistent with medication-induced impairment in daily functioning. At this time, benefits of medication felt to outweigh potential risks. But we will continue to reassess need for psychotropic medication at regular 3 to 6 month intervals, or sooner as clinically indicated.          #Medication Monitoring Plan:   -Patient not currently taking medications requiring routine laboratory monitoring         #MDM/Complexity Issues    [x] Chronic medical comorbidities     [x] Chronic risk of harm to self/others     [x] Intellectual/Developmental disability     [x] Need for independent historian due to intellectual/developmental disability and/or impaired   communication abilities     [] Controlled substance medication requiring regular monitoring     [] Medication requiring significant ongoing monitoring for toxicity       #Counseling Provided     [x] Diagnostic impression/prognosis      [x] Risks and benefits of treatment options     [x] Instruction for management/treatment and follow-up     [x] Educated patient/caregiver about: behavioral interventions, sleep hygiene, safety planning                  #Coordination of care provided    [x] Family    [] Caregiver/DSP/Staff       []Agency supervisor       []  Nursing staff      [] SSA/          []Therapist                     [] Guardian      #Follow-up      -Follow-up in about 2 to 3 months, or sooner if new/worsening symptoms         Time  -Prep time on date of the patient encounter: 10 minutes  -Time spent directly with patient/family/caregiver: 25 minutes  -Additional time spent on patient care activities: 10 minutes  -Documentation time: 10 minutes  -Total time on date of patient encounter: 55 minutes     Scribe Attestation  By signing my name below, IBrittny Scribe   attest that this documentation has been prepared under the direction and in the presence of Leyla Bell DO.

## 2025-07-03 ENCOUNTER — APPOINTMENT (OUTPATIENT)
Dept: BEHAVIORAL HEALTH | Facility: CLINIC | Age: 30
End: 2025-07-03
Payer: MEDICAID

## 2025-07-03 DIAGNOSIS — Z93.1 PEG (PERCUTANEOUS ENDOSCOPIC GASTROSTOMY) STATUS (MULTI): ICD-10-CM

## 2025-07-03 DIAGNOSIS — Z86.59 PSYCHIATRIC FOLLOW-UP: ICD-10-CM

## 2025-07-03 DIAGNOSIS — F93.0 SEPARATION ANXIETY: ICD-10-CM

## 2025-07-03 DIAGNOSIS — F90.2 ATTENTION DEFICIT HYPERACTIVITY DISORDER (ADHD), COMBINED TYPE: ICD-10-CM

## 2025-07-03 DIAGNOSIS — F84.0 AUTISM SPECTRUM DISORDER (HHS-HCC): ICD-10-CM

## 2025-07-03 DIAGNOSIS — F79 INTELLECTUAL DISABILITY: ICD-10-CM

## 2025-07-03 DIAGNOSIS — F41.3 OTHER MIXED ANXIETY DISORDERS: ICD-10-CM

## 2025-07-03 DIAGNOSIS — Z09 PSYCHIATRIC FOLLOW-UP: ICD-10-CM

## 2025-07-03 PROCEDURE — 99214 OFFICE O/P EST MOD 30 MIN: CPT | Performed by: STUDENT IN AN ORGANIZED HEALTH CARE EDUCATION/TRAINING PROGRAM

## 2025-07-07 ASSESSMENT — PROMIS GLOBAL HEALTH SCALE
RATE_AVERAGE_FATIGUE: MILD
RATE_AVERAGE_PAIN: 3
RATE_GENERAL_HEALTH: VERY GOOD
RATE_QUALITY_OF_LIFE: VERY GOOD
RATE_SOCIAL_SATISFACTION: VERY GOOD
CARRYOUT_SOCIAL_ACTIVITIES: GOOD
RATE_MENTAL_HEALTH: VERY GOOD
EMOTIONAL_PROBLEMS: SOMETIMES
CARRYOUT_PHYSICAL_ACTIVITIES: COMPLETELY
RATE_PHYSICAL_HEALTH: VERY GOOD

## 2025-07-08 ENCOUNTER — APPOINTMENT (OUTPATIENT)
Dept: PRIMARY CARE | Facility: CLINIC | Age: 30
End: 2025-07-08
Payer: MEDICAID

## 2025-07-08 VITALS
DIASTOLIC BLOOD PRESSURE: 80 MMHG | HEART RATE: 90 BPM | SYSTOLIC BLOOD PRESSURE: 130 MMHG | TEMPERATURE: 97.4 F | WEIGHT: 151 LBS | RESPIRATION RATE: 16 BRPM | BODY MASS INDEX: 22.88 KG/M2 | HEIGHT: 68 IN

## 2025-07-08 DIAGNOSIS — F84.0 AUTISM SPECTRUM DISORDER (HHS-HCC): ICD-10-CM

## 2025-07-08 DIAGNOSIS — J30.1 ALLERGIC RHINITIS DUE TO POLLEN, UNSPECIFIED SEASONALITY: ICD-10-CM

## 2025-07-08 DIAGNOSIS — Z00.00 PHYSICAL EXAM: Primary | ICD-10-CM

## 2025-07-08 DIAGNOSIS — K20.0 EOSINOPHILIC ESOPHAGITIS: ICD-10-CM

## 2025-07-08 PROCEDURE — 1036F TOBACCO NON-USER: CPT | Performed by: FAMILY MEDICINE

## 2025-07-08 PROCEDURE — 99395 PREV VISIT EST AGE 18-39: CPT | Performed by: FAMILY MEDICINE

## 2025-07-08 PROCEDURE — 3008F BODY MASS INDEX DOCD: CPT | Performed by: FAMILY MEDICINE

## 2025-07-08 RX ORDER — FLUTICASONE PROPIONATE 50 MCG
1 SPRAY, SUSPENSION (ML) NASAL DAILY
Qty: 16 G | Refills: 11 | Status: SHIPPED | OUTPATIENT
Start: 2025-07-08 | End: 2026-07-08

## 2025-07-08 NOTE — PROGRESS NOTES
"Subjective   Patient ID: Norma Salinas is a 30 y.o. male who presents for Annual Exam.  HPI30 y/o M h/o autism and developmental delay, anorexia and malnutrition s/p PEG placement, severe EoE, anxiety, and depression ADD , following with psychiatry. for the PEG tube feeding , patient is following with GI and nutrition.      Scheduled with behavioral , tyring to get him to a farm when the weather is better with the help of his aid.      His psychiatrist is Dr. Leyla Bell, DO     patient is with his mom who is his gardian.   gets care giving agency monday thorough friday when she works from home. And she used to go to day program.   GI takes care of the tube feeding.   His GI is Dr Nieto .         Sometimes swallows  He still has feeding tube    He is using 6 and half cans a day , Peptamen 2.5 7 per day.      Stopepd the ADHD treatment due to agitation.      Scrottum healed well.      No bed sore ,still physically active.     Review of Systems    Medical History[1]    Surgical History[2]   Social History[3]   Family History[4]    MEDICATIONS AND ALLERGIES:    ALLERGIES Omeprazole, Other, and Shrimp    MEDICATIONS   Medications Ordered Prior to Encounter[5]           Objective   Visit Vitals  /80   Pulse 90   Temp 36.3 °C (97.4 °F) (Temporal)   Resp 16   Ht 1.727 m (5' 8\")   Wt 68.5 kg (151 lb)   BMI 22.96 kg/m²   Smoking Status Never   BSA 1.81 m²          7/13/2023     3:21 PM 1/23/2024    11:25 AM 3/18/2024     9:13 AM 9/27/2024     8:49 AM 2/12/2025    10:32 AM 6/16/2025     1:12 PM 7/8/2025    11:08 AM   Vitals   Systolic  126     130   Diastolic  70     80   BP Location  Left arm        Heart Rate  78 94 78  74 90   Temp  36.7 °C (98.1 °F)  36.7 °C (98.1 °F)   36.3 °C (97.4 °F)   Resp    16   16   Height 1.727 m (5' 8\") 1.778 m (5' 10\") 1.727 m (5' 8\")  1.727 m (5' 8\") 1.727 m (5' 8\") 1.727 m (5' 8\")   Weight (lb) 137 153 153   153 151   BMI 20.83 kg/m2 21.95 kg/m2 23.26 kg/m2  23.26 kg/m2 " 23.26 kg/m2 22.96 kg/m2   BSA (m2) 1.73 m2 1.85 m2 1.82 m2  1.82 m2 1.82 m2 1.81 m2   Visit Report  Report Report Report  Report Report     Physical Exam  Constitutional:       Appearance: Normal appearance.   HENT:      Head: Normocephalic and atraumatic.   Eyes:      Pupils: Pupils are equal, round, and reactive to light.   Cardiovascular:      Rate and Rhythm: Normal rate.   Pulmonary:      Effort: Pulmonary effort is normal.   Musculoskeletal:         General: No swelling.      Cervical back: Normal range of motion.   Skin:     Coloration: Skin is not jaundiced.   Neurological:      General: No focal deficit present.      Mental Status: He is alert and oriented to person, place, and time.             Assessment & Plan  Physical exam  Stable per mother  Today pacing around   A lot of energy   Plan to start a day program through [employer].          Eosinophilic esophagitis  Not eating well , gaining weight   Formula , 12 hours span   [place]   They are located with nutritionist.              Allergic rhinitis due to pollen, unspecified seasonality  Will treat with fluticasone   Orders:    fluticasone (Flonase) 50 mcg/actuation nasal spray; Administer 1 spray into each nostril once daily. Shake gently. Before first use, prime pump. After use, clean tip and replace cap.    Autism spectrum disorder ([facility])  He will start a day program through [employer]   They will pick him and                        [1]   Past Medical History:  Diagnosis Date    ADHD (attention deficit hyperactivity disorder)     Allergic     Asthma     Personal history of other diseases of the respiratory system 04/25/2018    History of sore throat    Personal history of other diseases of the respiratory system     History of asthma    Personal history of other mental and behavioral disorders     History of autism   [2]   Past Surgical History:  Procedure Laterality Date    GASTROSTOMY  2008    OTHER SURGICAL HISTORY  02/11/2020     Esophagogastroduodenoscopy    OTHER SURGICAL HISTORY  02/11/2020    Percutaneous endoscopic jejunostomy tube insertion   [3]   Social History  Socioeconomic History    Marital status: Single   Tobacco Use    Smoking status: Never    Smokeless tobacco: Never   Substance and Sexual Activity    Alcohol use: Never    Drug use: Never    Sexual activity: Never   [4]   Family History  Problem Relation Name Age of Onset    Alcohol abuse Maternal Grandfather Rashad     Drug abuse Maternal Grandfather Rashad     Cancer Maternal Grandfather Rashad     Asthma Sister Cecilia     Asthma Sister Lucho     Cancer Maternal Grandmother Jennifer     Hypertension Maternal Grandmother Jennifer     Heart disease Maternal Grandmother Jennifer     Depression Brother Micah     Miscarriages / Stillbirths Mother Fatou    [5]   Current Outpatient Medications on File Prior to Visit   Medication Sig Dispense Refill    ascorbic acid/vitamin E (VITAMIN C-VITAMIN E ORAL) Take 1 each by mouth once daily.      B.animalis,bifid,infantis,long (PROBIOTIC 4X ORAL) Take 1 each by mouth once daily.      cholecalciferol (Vitamin D-3) 125 MCG (5000 UT) capsule Take 1 capsule (125 mcg) by mouth once daily.      cloNIDine (Catapres) 0.1 mg tablet Take SCHEDULED three times per day via G-tube: one tablet (0.1mg) in morning (8:00am), one tablet (0.1mg) in midday (2:00pm), and one tablet (0.1mg) in evening (8:00pm) for anxiety/ADHD [F41.3/F90.2] 270 tablet 1    fluticasone (Flonase) 50 mcg/actuation nasal spray USE 1 SPRAY IN EACH NOSTRIL ONCE DAILY, SHAKE GENTLY, BEFORE FIRST USE PRIME PUMP, AFTER USE CLEAN TIP AND REPLACE*CAP 16 g 11    esomeprazole (NexIUM Packet) 40 mg packet Take 40 mg by mouth once daily. Ok to give as suspension through PEG tube. (Patient not taking: Reported on 7/8/2025) 30 each 11    nutritional supplement/fiber (PEPTIDE FORMULA 1.5 ORAL) Take 1 Container by mouth 5 times a day.       No current facility-administered medications on file prior  to visit.